# Patient Record
Sex: MALE | Race: ASIAN | NOT HISPANIC OR LATINO | URBAN - METROPOLITAN AREA
[De-identification: names, ages, dates, MRNs, and addresses within clinical notes are randomized per-mention and may not be internally consistent; named-entity substitution may affect disease eponyms.]

---

## 2018-06-11 ENCOUNTER — EMERGENCY (EMERGENCY)
Facility: HOSPITAL | Age: 33
LOS: 1 days | Discharge: HOME | End: 2018-06-11
Attending: EMERGENCY MEDICINE | Admitting: EMERGENCY MEDICINE
Payer: MEDICAID

## 2018-06-11 VITALS
HEART RATE: 88 BPM | DIASTOLIC BLOOD PRESSURE: 76 MMHG | OXYGEN SATURATION: 99 % | RESPIRATION RATE: 16 BRPM | SYSTOLIC BLOOD PRESSURE: 105 MMHG | TEMPERATURE: 99 F

## 2018-06-11 PROCEDURE — 99283 EMERGENCY DEPT VISIT LOW MDM: CPT

## 2018-06-11 RX ORDER — POLYMYXIN B SULF/TRIMETHOPRIM 10000-1/ML
2 DROPS OPHTHALMIC (EYE)
Qty: 1 | Refills: 0
Start: 2018-06-11 | End: 2018-06-17

## 2018-06-11 RX ORDER — POLYMYXIN B SULF/TRIMETHOPRIM 10000-1/ML
2 DROPS OPHTHALMIC (EYE) ONCE
Qty: 0 | Refills: 0 | Status: DISCONTINUED | OUTPATIENT
Start: 2018-06-11 | End: 2018-06-15

## 2018-06-11 NOTE — ED PROVIDER NOTE - OBJECTIVE STATEMENT
32y M with no PMHx presents to the ED for bilateral eye pain, eye irritation, photophobia, and blurry vision x2 months. States s/x is intermittent. Also has eye discharge  x2 weeks. Has crusting in eyes and needs to wash eye out. Currently using antihistamine eyedrops. Reports he woke up with blood in eye and blood crusted eye shut 2 months ago. 32y M with no PMHx presents to the ED for bilateral eye pain, eye irritation, photophobia, and blurry vision x2 months. States s/x is intermittent. Also has eye discharge  x2 weeks. Has crusting in eyes and needs to wash eye out. Currently using antihistamine eyedrops. Reports he woke up with blood in eye and blood crusted eye shut 2 months ago but none since.

## 2018-06-11 NOTE — ED PROVIDER NOTE - EYE EXAM METHOD
Vision 20/20 both eyes. EOMI. PERRL. Bilateral conjunctival irritation with episcleral injection. No discharge or bleeding from eyes at this time Vision 20/20 both eyes. EOMI. PERRL. Bilateral conjunctival irritation with episcleral injection. No discharge or bleeding from eyes at this time.  Lashes normal.

## 2018-06-11 NOTE — ED PROVIDER NOTE - MEDICAL DECISION MAKING DETAILS
Consistent with conjunctivitis. Allergic vs viral vs bacterial. Will treat like bacterial and give outpt opthalmology followup

## 2018-06-11 NOTE — ED ADULT TRIAGE NOTE - CHIEF COMPLAINT QUOTE
states" I have redness with discharge to both eyes with sand maribel feeling for almost 2 months and using Kevin eye drops with no relief."

## 2019-08-11 ENCOUNTER — INPATIENT (INPATIENT)
Facility: HOSPITAL | Age: 34
LOS: 1 days | Discharge: HOME | End: 2019-08-13
Attending: HOSPITALIST | Admitting: HOSPITALIST
Payer: MEDICAID

## 2019-08-11 VITALS
OXYGEN SATURATION: 100 % | RESPIRATION RATE: 18 BRPM | TEMPERATURE: 100 F | SYSTOLIC BLOOD PRESSURE: 122 MMHG | DIASTOLIC BLOOD PRESSURE: 82 MMHG | HEART RATE: 91 BPM

## 2019-08-11 DIAGNOSIS — M35.2 BEHCET'S DISEASE: ICD-10-CM

## 2019-08-11 LAB
ALBUMIN SERPL ELPH-MCNC: 4.8 G/DL — SIGNIFICANT CHANGE UP (ref 3.3–5)
ALP SERPL-CCNC: 81 U/L — SIGNIFICANT CHANGE UP (ref 40–120)
ALT FLD-CCNC: 56 U/L — HIGH (ref 4–41)
ANION GAP SERPL CALC-SCNC: 15 MMO/L — HIGH (ref 7–14)
AST SERPL-CCNC: 54 U/L — HIGH (ref 4–40)
BASOPHILS # BLD AUTO: 0.04 K/UL — SIGNIFICANT CHANGE UP (ref 0–0.2)
BASOPHILS NFR BLD AUTO: 0.6 % — SIGNIFICANT CHANGE UP (ref 0–2)
BILIRUB SERPL-MCNC: 1 MG/DL — SIGNIFICANT CHANGE UP (ref 0.2–1.2)
BUN SERPL-MCNC: 9 MG/DL — SIGNIFICANT CHANGE UP (ref 7–23)
CALCIUM SERPL-MCNC: 10 MG/DL — SIGNIFICANT CHANGE UP (ref 8.4–10.5)
CHLORIDE SERPL-SCNC: 94 MMOL/L — LOW (ref 98–107)
CO2 SERPL-SCNC: 25 MMOL/L — SIGNIFICANT CHANGE UP (ref 22–31)
CREAT SERPL-MCNC: 0.71 MG/DL — SIGNIFICANT CHANGE UP (ref 0.5–1.3)
CRP SERPL-MCNC: < 4 MG/L — SIGNIFICANT CHANGE UP
EOSINOPHIL # BLD AUTO: 0.09 K/UL — SIGNIFICANT CHANGE UP (ref 0–0.5)
EOSINOPHIL NFR BLD AUTO: 1.4 % — SIGNIFICANT CHANGE UP (ref 0–6)
ERYTHROCYTE [SEDIMENTATION RATE] IN BLOOD: 7 MM/HR — SIGNIFICANT CHANGE UP (ref 1–15)
GLUCOSE SERPL-MCNC: 91 MG/DL — SIGNIFICANT CHANGE UP (ref 70–99)
HCT VFR BLD CALC: 44.2 % — SIGNIFICANT CHANGE UP (ref 39–50)
HGB BLD-MCNC: 15.2 G/DL — SIGNIFICANT CHANGE UP (ref 13–17)
HIV COMBO RESULT: SIGNIFICANT CHANGE UP
HIV1+2 AB SPEC QL: SIGNIFICANT CHANGE UP
IMM GRANULOCYTES NFR BLD AUTO: 0.3 % — SIGNIFICANT CHANGE UP (ref 0–1.5)
LYMPHOCYTES # BLD AUTO: 1.54 K/UL — SIGNIFICANT CHANGE UP (ref 1–3.3)
LYMPHOCYTES # BLD AUTO: 24.1 % — SIGNIFICANT CHANGE UP (ref 13–44)
MCHC RBC-ENTMCNC: 31.1 PG — SIGNIFICANT CHANGE UP (ref 27–34)
MCHC RBC-ENTMCNC: 34.4 % — SIGNIFICANT CHANGE UP (ref 32–36)
MCV RBC AUTO: 90.6 FL — SIGNIFICANT CHANGE UP (ref 80–100)
MONOCYTES # BLD AUTO: 0.61 K/UL — SIGNIFICANT CHANGE UP (ref 0–0.9)
MONOCYTES NFR BLD AUTO: 9.5 % — SIGNIFICANT CHANGE UP (ref 2–14)
NEUTROPHILS # BLD AUTO: 4.09 K/UL — SIGNIFICANT CHANGE UP (ref 1.8–7.4)
NEUTROPHILS NFR BLD AUTO: 64.1 % — SIGNIFICANT CHANGE UP (ref 43–77)
NRBC # FLD: 0 K/UL — SIGNIFICANT CHANGE UP (ref 0–0)
PLATELET # BLD AUTO: 193 K/UL — SIGNIFICANT CHANGE UP (ref 150–400)
PMV BLD: 9.8 FL — SIGNIFICANT CHANGE UP (ref 7–13)
POTASSIUM SERPL-MCNC: 4.2 MMOL/L — SIGNIFICANT CHANGE UP (ref 3.5–5.3)
POTASSIUM SERPL-SCNC: 4.2 MMOL/L — SIGNIFICANT CHANGE UP (ref 3.5–5.3)
PROT SERPL-MCNC: 7.7 G/DL — SIGNIFICANT CHANGE UP (ref 6–8.3)
RBC # BLD: 4.88 M/UL — SIGNIFICANT CHANGE UP (ref 4.2–5.8)
RBC # FLD: 12.7 % — SIGNIFICANT CHANGE UP (ref 10.3–14.5)
SODIUM SERPL-SCNC: 134 MMOL/L — LOW (ref 135–145)
WBC # BLD: 6.39 K/UL — SIGNIFICANT CHANGE UP (ref 3.8–10.5)
WBC # FLD AUTO: 6.39 K/UL — SIGNIFICANT CHANGE UP (ref 3.8–10.5)

## 2019-08-11 PROCEDURE — 71046 X-RAY EXAM CHEST 2 VIEWS: CPT | Mod: 26

## 2019-08-11 RX ORDER — ACETAMINOPHEN 500 MG
975 TABLET ORAL ONCE
Refills: 0 | Status: COMPLETED | OUTPATIENT
Start: 2019-08-11 | End: 2019-08-11

## 2019-08-11 RX ORDER — DIPHENHYDRAMINE HCL 50 MG
12.5 CAPSULE ORAL ONCE
Refills: 0 | Status: COMPLETED | OUTPATIENT
Start: 2019-08-11 | End: 2019-08-11

## 2019-08-11 RX ORDER — LIDOCAINE 4 G/100G
10 CREAM TOPICAL ONCE
Refills: 0 | Status: COMPLETED | OUTPATIENT
Start: 2019-08-11 | End: 2019-08-11

## 2019-08-11 RX ORDER — KETOROLAC TROMETHAMINE 30 MG/ML
15 SYRINGE (ML) INJECTION ONCE
Refills: 0 | Status: DISCONTINUED | OUTPATIENT
Start: 2019-08-11 | End: 2019-08-11

## 2019-08-11 RX ORDER — SODIUM CHLORIDE 9 MG/ML
2000 INJECTION INTRAMUSCULAR; INTRAVENOUS; SUBCUTANEOUS ONCE
Refills: 0 | Status: COMPLETED | OUTPATIENT
Start: 2019-08-11 | End: 2019-08-11

## 2019-08-11 RX ADMIN — Medication 15 MILLIGRAM(S): at 21:21

## 2019-08-11 RX ADMIN — LIDOCAINE 10 MILLILITER(S): 4 CREAM TOPICAL at 21:21

## 2019-08-11 RX ADMIN — SODIUM CHLORIDE 1000 MILLILITER(S): 9 INJECTION INTRAMUSCULAR; INTRAVENOUS; SUBCUTANEOUS at 23:13

## 2019-08-11 RX ADMIN — Medication 975 MILLIGRAM(S): at 21:20

## 2019-08-11 RX ADMIN — Medication 30 MILLILITER(S): at 21:21

## 2019-08-11 RX ADMIN — Medication 12.5 MILLIGRAM(S): at 21:30

## 2019-08-11 NOTE — ED PROVIDER NOTE - PHYSICAL EXAMINATION
HENMT: Upper anterior lip: bleeding sore, anterior lower gingiva: chanker sores, anterior lateral tongue on left: chanker sores, right lateral anterior internal mouth: chanker sore    Skin: HENMT: Upper anterior lip: bleeding sore, anterior lower gingiva: chanker sores, anterior lateral tongue on left: chanker sores, right lateral anterior internal mouth: chanker sore    Skin: (B) elbow target-like lesions: purple on inside, erythematous on ring, mildly tender

## 2019-08-11 NOTE — ED ADULT NURSE NOTE - ED STAT RN HANDOFF DETAILS
endorsed to rn sadu. a&o x3, nad noted. safety maintained. offers no medical complaints at this time

## 2019-08-11 NOTE — ED PROVIDER NOTE - CLINICAL SUMMARY MEDICAL DECISION MAKING FREE TEXT BOX
Itzel: ? Behcet's (conjunctivitis, oral ulcers, rash) vs. lupus vs. viral. Check serology. Admit. Pain control.

## 2019-08-11 NOTE — ED PROVIDER NOTE - OBJECTIVE STATEMENT
32 y/o M presents to the ED c/o mouth pain and sores that started 2 months ago. Pt states that he went to a water park where he first felt pain in his cheek bones. He went to a doctor where he was given antiviral medication 2 and a half weeks ago. He rook the medication for 3 days and the pain subsided. Once he stopped taking it, the pain got much worse. Pt also has bumps on his arm and L eye redness and blurriness. Pt's family member states that he has night sweats but denies fever, or  pain. pt currently can not eat food and has subsequently loss weight.   PMH/PSH: negative  FH/SH: non-contributory, except as noted in the HPI  Allergies: No known drug allergies  Immunizations: Up-to-date  Medications: Polytrim

## 2019-08-11 NOTE — ED ADULT TRIAGE NOTE - CHIEF COMPLAINT QUOTE
pt c/o sores on tongue and inside mouth  seen by pmd given antibx and it was going away . so pt stopped taking it/ now it back  also c/o of sores on arms.  c/o itchy feeling to sores and  bruises breakout after scratching

## 2019-08-11 NOTE — ED ADULT NURSE NOTE - OBJECTIVE STATEMENT
33 y male A+OX3 presents to the ER with the complaints of sores to the mouth/lips and both his arms. Pt states initially the sores were on his mouth and lip which he was prescribed valtrex for. Sores went away and pt stopped the med. Sores then reoccurred again in the mouth and lips that became worse and painful. Pt also started experiencing sores that are itchy in both his hands and elbows. Pt denies any recent travel, new meds, or encounter with animals or plants. Pt denies any PMH.  Pt also complains of left eye irritation. Pt denies sores spreading to any other parts of the body. 20 g iv placed on left ac, labs drawn and sent.

## 2019-08-12 ENCOUNTER — RESULT REVIEW (OUTPATIENT)
Age: 34
End: 2019-08-12

## 2019-08-12 DIAGNOSIS — Z87.891 PERSONAL HISTORY OF NICOTINE DEPENDENCE: ICD-10-CM

## 2019-08-12 DIAGNOSIS — F10.10 ALCOHOL ABUSE, UNCOMPLICATED: ICD-10-CM

## 2019-08-12 DIAGNOSIS — H10.9 UNSPECIFIED CONJUNCTIVITIS: ICD-10-CM

## 2019-08-12 DIAGNOSIS — K12.1 OTHER FORMS OF STOMATITIS: ICD-10-CM

## 2019-08-12 DIAGNOSIS — R21 RASH AND OTHER NONSPECIFIC SKIN ERUPTION: ICD-10-CM

## 2019-08-12 DIAGNOSIS — Z29.9 ENCOUNTER FOR PROPHYLACTIC MEASURES, UNSPECIFIED: ICD-10-CM

## 2019-08-12 DIAGNOSIS — R74.0 NONSPECIFIC ELEVATION OF LEVELS OF TRANSAMINASE AND LACTIC ACID DEHYDROGENASE [LDH]: ICD-10-CM

## 2019-08-12 LAB
ALBUMIN SERPL ELPH-MCNC: 4.2 G/DL — SIGNIFICANT CHANGE UP (ref 3.3–5)
ALP SERPL-CCNC: 70 U/L — SIGNIFICANT CHANGE UP (ref 40–120)
ALT FLD-CCNC: 42 U/L — HIGH (ref 4–41)
ANION GAP SERPL CALC-SCNC: 14 MMO/L — SIGNIFICANT CHANGE UP (ref 7–14)
APPEARANCE UR: CLEAR — SIGNIFICANT CHANGE UP
AST SERPL-CCNC: 42 U/L — HIGH (ref 4–40)
BACTERIA # UR AUTO: NEGATIVE — SIGNIFICANT CHANGE UP
BILIRUB SERPL-MCNC: 1.4 MG/DL — HIGH (ref 0.2–1.2)
BILIRUB UR-MCNC: NEGATIVE — SIGNIFICANT CHANGE UP
BLOOD UR QL VISUAL: NEGATIVE — SIGNIFICANT CHANGE UP
BUN SERPL-MCNC: 9 MG/DL — SIGNIFICANT CHANGE UP (ref 7–23)
CALCIUM SERPL-MCNC: 9.3 MG/DL — SIGNIFICANT CHANGE UP (ref 8.4–10.5)
CHLORIDE SERPL-SCNC: 98 MMOL/L — SIGNIFICANT CHANGE UP (ref 98–107)
CMV IGM FLD-ACNC: <8 AU/ML — SIGNIFICANT CHANGE UP
CMV IGM SERPL QL: NEGATIVE — SIGNIFICANT CHANGE UP
CO2 SERPL-SCNC: 25 MMOL/L — SIGNIFICANT CHANGE UP (ref 22–31)
COLOR SPEC: YELLOW — SIGNIFICANT CHANGE UP
CREAT SERPL-MCNC: 0.67 MG/DL — SIGNIFICANT CHANGE UP (ref 0.5–1.3)
EBV EA AB TITR SER IF: POSITIVE — SIGNIFICANT CHANGE UP
EBV EA IGG SER-ACNC: NEGATIVE — SIGNIFICANT CHANGE UP
EBV PATRN SPEC IB-IMP: SIGNIFICANT CHANGE UP
EBV VCA IGG AVIDITY SER QL IA: POSITIVE — SIGNIFICANT CHANGE UP
EBV VCA IGM TITR FLD: NEGATIVE — SIGNIFICANT CHANGE UP
GLUCOSE SERPL-MCNC: 78 MG/DL — SIGNIFICANT CHANGE UP (ref 70–99)
GLUCOSE UR-MCNC: NEGATIVE — SIGNIFICANT CHANGE UP
HAV IGM SER-ACNC: NONREACTIVE — SIGNIFICANT CHANGE UP
HBV CORE IGM SER-ACNC: NONREACTIVE — SIGNIFICANT CHANGE UP
HBV SURFACE AG SER-ACNC: NONREACTIVE — SIGNIFICANT CHANGE UP
HCT VFR BLD CALC: 44.5 % — SIGNIFICANT CHANGE UP (ref 39–50)
HCV AB S/CO SERPL IA: 0.07 S/CO — SIGNIFICANT CHANGE UP (ref 0–0.99)
HCV AB SERPL-IMP: SIGNIFICANT CHANGE UP
HGB BLD-MCNC: 14.7 G/DL — SIGNIFICANT CHANGE UP (ref 13–17)
HYALINE CASTS # UR AUTO: NEGATIVE — SIGNIFICANT CHANGE UP
KETONES UR-MCNC: HIGH
LEUKOCYTE ESTERASE UR-ACNC: NEGATIVE — SIGNIFICANT CHANGE UP
MAGNESIUM SERPL-MCNC: 1.6 MG/DL — SIGNIFICANT CHANGE UP (ref 1.6–2.6)
MCHC RBC-ENTMCNC: 30.6 PG — SIGNIFICANT CHANGE UP (ref 27–34)
MCHC RBC-ENTMCNC: 33 % — SIGNIFICANT CHANGE UP (ref 32–36)
MCV RBC AUTO: 92.5 FL — SIGNIFICANT CHANGE UP (ref 80–100)
NITRITE UR-MCNC: NEGATIVE — SIGNIFICANT CHANGE UP
NRBC # FLD: 0 K/UL — SIGNIFICANT CHANGE UP (ref 0–0)
PH UR: 6.5 — SIGNIFICANT CHANGE UP (ref 5–8)
PHOSPHATE SERPL-MCNC: 3.4 MG/DL — SIGNIFICANT CHANGE UP (ref 2.5–4.5)
PLATELET # BLD AUTO: 169 K/UL — SIGNIFICANT CHANGE UP (ref 150–400)
PMV BLD: 10.1 FL — SIGNIFICANT CHANGE UP (ref 7–13)
POTASSIUM SERPL-MCNC: 4.7 MMOL/L — SIGNIFICANT CHANGE UP (ref 3.5–5.3)
POTASSIUM SERPL-SCNC: 4.7 MMOL/L — SIGNIFICANT CHANGE UP (ref 3.5–5.3)
PROT SERPL-MCNC: 6.9 G/DL — SIGNIFICANT CHANGE UP (ref 6–8.3)
PROT UR-MCNC: 20 — SIGNIFICANT CHANGE UP
RBC # BLD: 4.81 M/UL — SIGNIFICANT CHANGE UP (ref 4.2–5.8)
RBC # FLD: 12.6 % — SIGNIFICANT CHANGE UP (ref 10.3–14.5)
RBC CASTS # UR COMP ASSIST: SIGNIFICANT CHANGE UP (ref 0–?)
SODIUM SERPL-SCNC: 137 MMOL/L — SIGNIFICANT CHANGE UP (ref 135–145)
SP GR SPEC: 1.04 — SIGNIFICANT CHANGE UP (ref 1–1.04)
SQUAMOUS # UR AUTO: SIGNIFICANT CHANGE UP
UROBILINOGEN FLD QL: SIGNIFICANT CHANGE UP
WBC # BLD: 5.51 K/UL — SIGNIFICANT CHANGE UP (ref 3.8–10.5)
WBC # FLD AUTO: 5.51 K/UL — SIGNIFICANT CHANGE UP (ref 3.8–10.5)
WBC UR QL: SIGNIFICANT CHANGE UP (ref 0–?)

## 2019-08-12 PROCEDURE — 88346 IMFLUOR 1ST 1ANTB STAIN PX: CPT | Mod: 26

## 2019-08-12 PROCEDURE — 88305 TISSUE EXAM BY PATHOLOGIST: CPT | Mod: 26

## 2019-08-12 PROCEDURE — 88350 IMFLUOR EA ADDL 1ANTB STN PX: CPT | Mod: 26

## 2019-08-12 PROCEDURE — 12345: CPT | Mod: NC,GC

## 2019-08-12 PROCEDURE — 99223 1ST HOSP IP/OBS HIGH 75: CPT

## 2019-08-12 PROCEDURE — 88312 SPECIAL STAINS GROUP 1: CPT | Mod: 26

## 2019-08-12 RX ORDER — NICOTINE POLACRILEX 2 MG
1 GUM BUCCAL DAILY
Refills: 0 | Status: DISCONTINUED | OUTPATIENT
Start: 2019-08-12 | End: 2019-08-13

## 2019-08-12 RX ORDER — FLUOCINONIDE/EMOLLIENT BASE 0.05 %
1 CREAM (GRAM) TOPICAL THREE TIMES A DAY
Refills: 0 | Status: DISCONTINUED | OUTPATIENT
Start: 2019-08-12 | End: 2019-08-12

## 2019-08-12 RX ORDER — ACETAMINOPHEN 500 MG
1000 TABLET ORAL EVERY 8 HOURS
Refills: 0 | Status: COMPLETED | OUTPATIENT
Start: 2019-08-12 | End: 2019-08-12

## 2019-08-12 RX ORDER — ENOXAPARIN SODIUM 100 MG/ML
40 INJECTION SUBCUTANEOUS DAILY
Refills: 0 | Status: DISCONTINUED | OUTPATIENT
Start: 2019-08-12 | End: 2019-08-13

## 2019-08-12 RX ORDER — VALACYCLOVIR 500 MG/1
1000 TABLET, FILM COATED ORAL
Refills: 0 | Status: DISCONTINUED | OUTPATIENT
Start: 2019-08-12 | End: 2019-08-13

## 2019-08-12 RX ORDER — ACETAMINOPHEN 500 MG
650 TABLET ORAL EVERY 6 HOURS
Refills: 0 | Status: DISCONTINUED | OUTPATIENT
Start: 2019-08-12 | End: 2019-08-13

## 2019-08-12 RX ORDER — DIPHENHYDRAMINE HYDROCHLORIDE AND LIDOCAINE HYDROCHLORIDE AND ALUMINUM HYDROXIDE AND MAGNESIUM HYDRO
5 KIT
Refills: 0 | Status: DISCONTINUED | OUTPATIENT
Start: 2019-08-12 | End: 2019-08-13

## 2019-08-12 RX ADMIN — Medication 1 APPLICATION(S): at 06:46

## 2019-08-12 RX ADMIN — Medication 400 MILLIGRAM(S): at 08:59

## 2019-08-12 RX ADMIN — Medication 1 APPLICATION(S): at 14:28

## 2019-08-12 RX ADMIN — Medication 1 PATCH: at 12:18

## 2019-08-12 RX ADMIN — Medication 650 MILLIGRAM(S): at 14:28

## 2019-08-12 RX ADMIN — DIPHENHYDRAMINE HYDROCHLORIDE AND LIDOCAINE HYDROCHLORIDE AND ALUMINUM HYDROXIDE AND MAGNESIUM HYDRO 5 MILLILITER(S): KIT at 06:47

## 2019-08-12 RX ADMIN — VALACYCLOVIR 1000 MILLIGRAM(S): 500 TABLET, FILM COATED ORAL at 17:54

## 2019-08-12 RX ADMIN — DIPHENHYDRAMINE HYDROCHLORIDE AND LIDOCAINE HYDROCHLORIDE AND ALUMINUM HYDROXIDE AND MAGNESIUM HYDRO 5 MILLILITER(S): KIT at 17:54

## 2019-08-12 RX ADMIN — Medication 40 MILLIGRAM(S): at 17:54

## 2019-08-12 RX ADMIN — Medication 650 MILLIGRAM(S): at 15:23

## 2019-08-12 RX ADMIN — Medication 1 PATCH: at 17:16

## 2019-08-12 RX ADMIN — ENOXAPARIN SODIUM 40 MILLIGRAM(S): 100 INJECTION SUBCUTANEOUS at 12:19

## 2019-08-12 RX ADMIN — DIPHENHYDRAMINE HYDROCHLORIDE AND LIDOCAINE HYDROCHLORIDE AND ALUMINUM HYDROXIDE AND MAGNESIUM HYDRO 5 MILLILITER(S): KIT at 13:01

## 2019-08-12 NOTE — H&P ADULT - PROBLEM SELECTOR PLAN 4
-related to underlying systemic/rheum/infectious issue vs 2/2 to recent valacyclovir use  -follow up acute viral panel  -monitor CMP, if worsening consider abdominal US for further evaluation

## 2019-08-12 NOTE — DIETITIAN INITIAL EVALUATION ADULT. - PERTINENT MEDS FT
Problem: Patient Care Overview  Goal: Plan of Care Review  Outcome: Outcome(s) achieved Date Met: 11/23/17  Infant voiding and stooling. Infant tolerating feedings. V/S WNL. No Distress noted.       Ativan

## 2019-08-12 NOTE — H&P ADULT - PROBLEM SELECTOR PLAN 2
-violaceous target-like lesions with scaling on extensor surfaces of upper extremities. DDx includes erythema multiforme vs psoriasis vs pemphigus vs atopic dermatitis   -Consider derm eval for skin biopsy to further aid in diagnosis

## 2019-08-12 NOTE — H&P ADULT - ASSESSMENT
32 y/o M with no significant PMH presents to the ED with oral ulcers, rash and red eye admitted for further w/u

## 2019-08-12 NOTE — H&P ADULT - PROBLEM SELECTOR PLAN 5
-Pt reports smoking 1 ppd. Will start on nicotine patch. Can continue discussion regarding motivation to quit  -Also endorses drinking nightly. Denies history of withdrawal symptoms. Currently no symptoms of withdrawal. Will monitor off CIWA -endorses drinking nightly. Denies history of withdrawal symptoms. Currently no symptoms of withdrawal however given volume of reported drinking, will place on symptom triggered CIWA  -SBIRT in am

## 2019-08-12 NOTE — H&P ADULT - PROBLEM SELECTOR PLAN 1
-Pt with oral mucosal ulcerations and erosions with associated rash and conjunctivitis   -Broad ddx including rheumatological vs infectious vs inflammatory etiology. Clinical picture concerning for Bechets disease vs limited erythema multiforme  Unlikely Raffy Med given small surface area involvement.  -HIV negative, ESR/CRP negative. F/u cmv, ebv, MALA  -Will treat with oral topical steroid and magic mouth wash solution 4x a day. If no improvement, can trial a course of steriods  -liquid diet for now  -Pain control -Pt with oral mucosal ulcerations and erosions with associated rash and conjunctivitis   -Broad ddx including rheumatological vs infectious vs inflammatory etiology. Clinical picture concerning for Bechets disease vs limited erythema multiforme  Unlikely Raffy Med given small surface area involvement.  -HIV negative, ESR/CRP negative. F/u cmv, ebv, MALA  -Will treat with oral topical steroid and magic mouth wash solution 4x a day. If no improvement, can trial a course of PO steroids  -liquid diet for now  -Pain control

## 2019-08-12 NOTE — H&P ADULT - NSHPREVIEWOFSYSTEMS_GEN_ALL_CORE
CONSTITUTIONAL: +weight loss +night sweats +fatigue No fever  EENT:  Eyes:  +left blurry vision No visual loss, double vision or yellow sclerae. Ears, Nose, Throat:  No hearing loss, sneezing, congestion, runny nose or sore throat.  SKIN:  +pruritic rash  CARDIOVASCULAR:  No chest pain, chest pressure or chest discomfort. No palpitations or edema.  RESPIRATORY:  No shortness of breath, cough or sputum.  GASTROINTESTINAL: +anorexia  No nausea, vomiting or diarrhea. No abdominal pain or blood.  GENITOURINARY:  Denies hematuria, dysuria.   NEUROLOGICAL:  No headache, dizziness, syncope, paralysis, ataxia, numbness or tingling in the extremities. No change in bowel or bladder control.  MUSCULOSKELETAL:  No muscle, back pain, joint pain or stiffness.  HEMATOLOGIC:  No anemia, bleeding or bruising.  LYMPHATICS:  No enlarged nodes. No history of splenectomy.  PSYCHIATRIC:  No history of depression or anxiety.  ENDOCRINOLOGIC:  No reports of sweating, cold or heat intolerance. No polyuria or polydipsia.  ALLERGIES:  No history of asthma, hives, eczema or rhinitis. CONSTITUTIONAL: +weight loss +night sweats +fatigue No fever  EENT:  Eyes:  +left blurry vision +oral ulcers No visual loss, double vision or yellow sclerae. Ears, Nose, Throat:  No hearing loss, sneezing, congestion, runny nose or sore throat.  SKIN:  +pruritic rash  CARDIOVASCULAR:  No chest pain, chest pressure or chest discomfort. No palpitations or edema.  RESPIRATORY:  No shortness of breath, cough or sputum.  GASTROINTESTINAL: +anorexia  No nausea, vomiting or diarrhea. No abdominal pain or blood.  GENITOURINARY:  Denies hematuria, dysuria.   NEUROLOGICAL:  No headache, dizziness, syncope, paralysis, ataxia, numbness or tingling in the extremities. No change in bowel or bladder control.  MUSCULOSKELETAL:  No muscle, back pain, joint pain or stiffness.  HEMATOLOGIC:  No anemia, bleeding or bruising.  LYMPHATICS:  No enlarged nodes. No history of splenectomy.  PSYCHIATRIC:  No history of depression or anxiety.  ENDOCRINOLOGIC:  No reports of sweating, cold or heat intolerance. No polyuria or polydipsia.  ALLERGIES:  No history of asthma, hives, eczema or rhinitis.

## 2019-08-12 NOTE — CONSULT NOTE ADULT - ASSESSMENT
34 yo M w/ no significant hx p/w painful oral ulcers and rash.  Patient had improvement on anti-viral medications, but then symptoms returned and worsened when meds were stopped early.    DDx include autoimmune vs infectious. Autoimmune etiologies include behcets, SLE (however lupus usually has painless ulcers), bullous pemphigoid, pemphigus vulgaris, erythema multiforme. Infectious etiologies include viral causes HSV, VZV, HIV, coxsackie.    - derm consulted, f/u recs  - skin bx done, f/u skin bx path  - oral swab sent, f/u results    Antony Dallas MD  Rheumatology Fellow PGY V

## 2019-08-12 NOTE — CONSULT NOTE ADULT - SUBJECTIVE AND OBJECTIVE BOX
HPI:    32 y/o M with no significant PMH presents to the ED with oral ulcers, rash forearms. Pt reports onset of ulcers on lip about 2 weeks ago. He was prescribed valacyclovir and took it for about 2-3 days but discontinued due to improvement. Pt reports shortly after stopping anti-viral therapy, he had recurrence of ulcers on lips inner cheek and on tongue. Patient developed rash on forearms and elbows 4 days ago and patient decided to come in for evaluation.     Dermatology consulted for etiology of the oral findings and rash on forearms/elbows.       PAST MEDICAL & SURGICAL HISTORY:  No pertinent past medical history  No significant past surgical history      REVIEW OF SYSTEMS    General: no fevers/chills, no lethargy	    Skin/Breast: see HPI  	  Ophthalmologic: some eye dryness   	  ENMT: pain eating due to oral lesions    Respiratory and Thorax: no SOB or cough  	  Cardiovascular: no palpitations or chest pain    Gastrointestinal: no abdominal pain or blood in stool     Genitourinary: no dysuria or frequency    Musculoskeletal: no joint pains    Neurological: no weakness, numbness , or tingling    MEDICATIONS  (STANDING):  enoxaparin Injectable 40 milliGRAM(s) SubCutaneous daily  FIRST- Mouthwash  BLM 5 milliLiter(s) Swish and Spit four times a day  nicotine - 21 mG/24Hr(s) Patch 1 patch Transdermal daily  predniSONE   Tablet   Oral   predniSONE   Tablet 40 milliGRAM(s) Oral daily  triamcinolone 0.1% Oral Paste 1 Application(s) Topical three times a day  valACYclovir 1000 milliGRAM(s) Oral two times a day    MEDICATIONS  (PRN):  acetaminophen    Suspension .. 650 milliGRAM(s) Oral every 6 hours PRN Mild Pain (1 - 3), Moderate Pain (4 - 6)  LORazepam     Tablet 2 milliGRAM(s) Oral every 2 hours PRN Symptom-triggered 2 point increase in CIWA-Ar      Allergies    No Known Allergies    Intolerances        SOCIAL HISTORY:    FAMILY HISTORY:  Family history of cirrhosis of liver: mom- 2/2 to alcohol abuse      Vital Signs Last 24 Hrs  T(C): 36.8 (12 Aug 2019 13:59), Max: 37.6 (11 Aug 2019 20:17)  T(F): 98.3 (12 Aug 2019 13:59), Max: 99.6 (11 Aug 2019 20:17)  HR: 65 (12 Aug 2019 13:59) (52 - 91)  BP: 100/68 (12 Aug 2019 13:59) (98/57 - 122/82)  BP(mean): --  RR: 17 (12 Aug 2019 13:59) (16 - 18)  SpO2: 99% (12 Aug 2019 13:59) (99% - 100%)    PHYSICAL EXAM:     The patient was alert and oriented X 3, well nourished, and in no  apparent distress.  OP showed no ulcerations  There was no visible lymphadenopathy.  Conjunctiva were non injected  There was no clubbing or edema of extremities.  The scalp, hair, face, eyebrows, lips, OP, neck, chest, back,   extremities X 4, nails were examined.  There was no hyperhidrosis or bromhidrosis.    Of note on skin exam:     oral ulcers on the vermilion lip, buccal mucosa, lower and upper lip, some hemorrhagic crust present upper lip and vermilion lip     bilateral forearms/ elbows: violaceous edematous plaques with hyperpigmented center region        LABS:                        14.7   5.51  )-----------( 169      ( 12 Aug 2019 05:27 )             44.5     08-12    137  |  98  |  9   ----------------------------<  78  4.7   |  25  |  0.67    Ca    9.3      12 Aug 2019 05:27  Phos  3.4     08-12  Mg     1.6     -12    TPro  6.9  /  Alb  4.2  /  TBili  1.4<H>  /  DBili  x   /  AST  42<H>  /  ALT  42<H>  /  AlkPhos  70  08-12      Urinalysis Basic - ( 12 Aug 2019 06:55 )    Color: YELLOW / Appearance: CLEAR / S.036 / pH: 6.5  Gluc: NEGATIVE / Ketone: MODERATE  / Bili: NEGATIVE / Urobili: TRACE   Blood: NEGATIVE / Protein: 20 / Nitrite: NEGATIVE   Leuk Esterase: NEGATIVE / RBC: 0-2 / WBC 3-5   Sq Epi: OCC / Non Sq Epi: x / Bacteria: NEGATIVE        RADIOLOGY & ADDITIONAL STUDIES:

## 2019-08-12 NOTE — H&P ADULT - PROBLEM SELECTOR PLAN 3
-redness and pruritis of eye, mild pain with associated blurriness of vision. Concerning for uveitis (in the setting of associated rash and oral ulcers) vs infectious etiology (viral, bacterial)  -Opthalmology eval in AM

## 2019-08-12 NOTE — DIETITIAN INITIAL EVALUATION ADULT. - OTHER INFO
Pt has blisters on his lips and tongue, a rash and red eye. Pt has not been able to eat very much. Pt is on full liquid diet. Suggest Pt be started on Ensure Enlive and Ensure pudding. Pt willing to try supplements.

## 2019-08-12 NOTE — DIETITIAN INITIAL EVALUATION ADULT. - PROBLEM SELECTOR PLAN 1
-Pt with oral mucosal ulcerations and erosions with associated rash and conjunctivitis   -Broad ddx including rheumatological vs infectious vs inflammatory etiology. Clinical picture concerning for Bechets disease vs limited erythema multiforme  Unlikely Raffy Med given small surface area involvement.  -HIV negative, ESR/CRP negative. F/u cmv, ebv, MALA  -Will treat with oral topical steroid and magic mouth wash solution 4x a day. If no improvement, can trial a course of PO steroids  -liquid diet for now  -Pain control

## 2019-08-12 NOTE — PATIENT PROFILE ADULT - TOBACCO CESSATION EDUCATION/COUNSELLING(PROVIDED IF TOBACCO USED IN THE PAST 30 DAYS) NON CORE MEASURE SITES
Pharmacist called; had questions regarding prescription. Seen in  03/04 by Perla  
Spoke with [facility]. They have no knowledge on who called. No outpatient medications were prescribed for visit per chart review. Closing encounter.  Madai PARTIDA CMA...3/6/2019 11:59 AM    
Offered and patient declined

## 2019-08-12 NOTE — H&P ADULT - HISTORY OF PRESENT ILLNESS
32 y/o M with no significant PMH presents to the ED with oral ulcers, rash and red eye. Pt reports onset of ulcers on lip about 2 weeks ago. He was prescribed valacyclovir and took it for about 2-3 days but discontinued due to improvement. Pt reports shortly after stopping anti-viral therapy, he had recurrence of ulcers on lips inner cheek and on tongue. He also noted a new rash on his upper extremities and a red eye that is pruritic and intermittently associated with blurry vision. Pt reports he had similar red eye with blurry vision a year ago and was evaluated by opthalmology He is unclear of the diagnosis and states he was prescribed antibiotics gtts as well as another unidentifiable eye drop. Pt reports the oral ulcers have progressed to the point that he is unable to eat. Denies dysphagia symptoms, N/V, abdominal pain but reports a 10 lb weight loss over 2 weeks secondary to decreased PO intake. Reports night sweats, denies fevers, chills, chest pain, shortness of breath, dysuria, genital ulcers, arthralgias, numbness or weakness. Denies recent travel or sick contacts. 34 y/o M with no significant PMH presents to the ED with oral ulcers, rash and red eye. Pt reports onset of ulcers on lip about 2 weeks ago. He was prescribed valacyclovir and took it for about 2-3 days but discontinued due to improvement. Pt reports shortly after stopping anti-viral therapy, he had recurrence of ulcers on lips inner cheek and on tongue. He also noted a new rash on his upper extremities and a red eye that is pruritic and intermittently associated with blurry vision. Pt reports he had similar red eye with blurry vision a year ago and was evaluated by opthalmology He is unclear of the diagnosis and states he was prescribed antibiotics gtts as well as another unidentifiable eye drop. Pt reports the oral ulcers have progressed to the point that he is unable to eat. Denies dysphagia symptoms, N/V, abdominal pain but reports a 10 lb weight loss over 2 weeks secondary to decreased PO intake. Reports night sweats, denies fevers, chills, chest pain, shortness of breath, dysuria, genital ulcers, penile discharge, arthralgias, numbness or weakness. Denies recent travel or sick contacts. Sexually active with wife, denies h/o of STDs.

## 2019-08-12 NOTE — H&P ADULT - NSHPLABSRESULTS_GEN_ALL_CORE
(08-11 @ 21:00)                      15.2  6.39 )-----------( 193                 44.2    Neutrophils = 4.09 (64.1%)  Lymphocytes = 1.54 (24.1%)  Eosinophils = 0.09 (1.4%)  Basophils = 0.04 (0.6%)  Monocytes = 0.61 (9.5%)  Bands = --%    08-11    134<L>  |  94<L>  |  9   ----------------------------<  91  4.2   |  25  |  0.71    Ca    10.0      11 Aug 2019 21:04    TPro  7.7  /  Alb  4.8  /  TBili  1.0  /  DBili  x   /  AST  54<H>  /  ALT  56<H>  /  AlkPhos  81  08-11      Labs reviewed.  Transaminitis otherwise unremarkable  Imaging reviewed.    < from: Xray Chest 2 Views PA/Lat (08.11.19 @ 21:25) >      INTERPRETATION:  Clear lungs    < end of copied text >

## 2019-08-12 NOTE — H&P ADULT - NSHPSOCIALHISTORY_GEN_ALL_CORE
Current 1 ppd smoker. Drinks 1/2 pint of Basalt and has 2-3 beers every night. Occasional marijuana user   Works in a store

## 2019-08-12 NOTE — CONSULT NOTE ADULT - ASSESSMENT
1. 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   The risks/benefits/alternatives of skin biopsy were explained to the patient, which include and are not limited to bleeding, infection, scarring or discoloration of skin, and recurrence of lesion. Patient expressed understanding of these risks and provided consent to the procedure. Time out with verification of patient and lesion site was performed. Site was prepped with rubbing alcohol, lidocaine with epinephrine was injected for anesthesia, and biopsy was performed. Specimen sent to path. Procedure was without complication and well tolerated. Wound care was discussed.  -Patient should keep area dry for 24 hours, and then can wash area. Apply Vaseline daily for 2 weeks. Chromic suture will dissolve in about 2 weeks. 1. 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   The risks/benefits/alternatives of skin biopsy were explained to the patient, which include and are not limited to bleeding, infection, scarring or discoloration of skin, and recurrence of lesion. Patient expressed understanding of these risks and provided consent to the procedure. Time out with verification of patient and lesion site was performed. Site was prepped with rubbing alcohol, lidocaine with epinephrine was injected for anesthesia, and biopsy was performed. Specimen sent to path. Procedure was without complication and well tolerated. Wound care was discussed.  -Patient should keep area dry for 24 hours, and then can wash area. Apply Vaseline daily for 2 weeks. Chromic suture will dissolve in about 2 weeks.

## 2019-08-12 NOTE — DIETITIAN INITIAL EVALUATION ADULT. - PROBLEM SELECTOR PLAN 6
-Pt reports smoking 1 ppd. Will start on nicotine patch. Can continue discussion regarding motivation to quit

## 2019-08-12 NOTE — CONSULT NOTE ADULT - SUBJECTIVE AND OBJECTIVE BOX
JOANNE LAU  [id number]    HISTORY OF PRESENT ILLNESS:        PAST MEDICAL & SURGICAL HISTORY:  No pertinent past medical history  No significant past surgical history      Review of Systems:  Gen:  No fevers/chills, weight loss  HEENT: No blurry vision, no difficulty swallowing, no oral or nasal ulcers  CVS: No chest pain/palpitations  Resp: No SOB/wheezing  GI: No N/V/C/D/abdominal pain  MSK:  Skin: No new rashes  Neuro: No headaches    MEDICATIONS  (STANDING):  enoxaparin Injectable 40 milliGRAM(s) SubCutaneous daily  FIRST- Mouthwash  BLM 5 milliLiter(s) Swish and Spit four times a day  nicotine - 21 mG/24Hr(s) Patch 1 patch Transdermal daily  triamcinolone 0.1% Oral Paste 1 Application(s) Topical three times a day    MEDICATIONS  (PRN):  acetaminophen    Suspension .. 650 milliGRAM(s) Oral every 6 hours PRN Mild Pain (1 - 3), Moderate Pain (4 - 6)  LORazepam     Tablet 2 milliGRAM(s) Oral every 2 hours PRN Symptom-triggered 2 point increase in CIWA-Ar      Allergies    No Known Allergies    Intolerances        PERTINENT MEDICATION HISTORY:    SOCIAL HISTORY:  OCCUPATION:  TRAVEL HISTORY:    FAMILY HISTORY:  Family history of cirrhosis of liver: mom- 2/2 to alcohol abuse      Vital Signs Last 24 Hrs  T(C): 36.7 (12 Aug 2019 09:51), Max: 37.6 (11 Aug 2019 20:17)  T(F): 98.1 (12 Aug 2019 09:51), Max: 99.6 (11 Aug 2019 20:17)  HR: 52 (12 Aug 2019 09:51) (52 - 91)  BP: 98/57 (12 Aug 2019 09:51) (98/57 - 122/82)  BP(mean): --  RR: 17 (12 Aug 2019 09:51) (16 - 18)  SpO2: 99% (12 Aug 2019 09:51) (99% - 100%)    Physical Exam:  General: No apparent distress  HEENT: EOMI, MMM  CVS: +S1/S2, RRR, no murmurs/rubs/gallops  Resp: CTA b/l. No crackles/wheezing  GI: Soft, NT/ND +BS  MSK:  Neuro: AAOx3  Skin: no visible rashes    LABS:                        14.7   5.51  )-----------( 169      ( 12 Aug 2019 05:27 )             44.5     08-12    137  |  98  |  9   ----------------------------<  78  4.7   |  25  |  0.67    Ca    9.3      12 Aug 2019 05:27  Phos  3.4     -  Mg     1.6         TPro  6.9  /  Alb  4.2  /  TBili  1.4<H>  /  DBili  x   /  AST  42<H>  /  ALT  42<H>  /  AlkPhos  70  0812      Urinalysis Basic - ( 12 Aug 2019 06:55 )    Color: YELLOW / Appearance: CLEAR / S.036 / pH: 6.5  Gluc: NEGATIVE / Ketone: MODERATE  / Bili: NEGATIVE / Urobili: TRACE   Blood: NEGATIVE / Protein: 20 / Nitrite: NEGATIVE   Leuk Esterase: NEGATIVE / RBC: 0-2 / WBC 3-5   Sq Epi: OCC / Non Sq Epi: x / Bacteria: NEGATIVE        RADIOLOGY & ADDITIONAL STUDIES: JOANNE LAU  [id number]    HISTORY OF PRESENT ILLNESS:    32 yo M w/ no significant hx p/w oral ulcers and rash.    Patient stated that about 1 month ago started to have some pain on the inside of his cheeks. The pain spread and continued to worsen. Then started to note oral ulcers on the inside of his mouth. He went to his PMD, was given anti-viral meds for presumed HSV. He took the meds for 3 days, his oral ulcers stopped enlarging but did not go away. After 3 days, patient stopped taking his meds because he thought his lesions would continue to resolve. Patient's oral ulcers started to worsen again. Then, several days prior to admission patient developed new rashes on his b/l elbow areas. Since the rash appeared it has not spread, worsened or improved. Patient denied ever having oral ulcers or a rash like this before. Denied any genital ulcers, new sexual partners. Patient denied any joint pains, alopecia, chest pain, SOB, abd pain, hemoptysis, hematuria, BRBPR, frothy urine, hx of blood clots. Denied famhx of oral ulcers.     PAST MEDICAL & SURGICAL HISTORY:  No pertinent past medical history  No significant past surgical history      Review of Systems:  Gen:  No fevers/chills, weight loss  HEENT: +oral ulcers  CVS: No chest pain/palpitations  Resp: No SOB/wheezing  GI: No N/V/C/D/abdominal pain  MSK: no joint pains  Skin: +rash  Neuro: No headaches    MEDICATIONS  (STANDING):  enoxaparin Injectable 40 milliGRAM(s) SubCutaneous daily  FIRST- Mouthwash  BLM 5 milliLiter(s) Swish and Spit four times a day  nicotine - 21 mG/24Hr(s) Patch 1 patch Transdermal daily  triamcinolone 0.1% Oral Paste 1 Application(s) Topical three times a day    MEDICATIONS  (PRN):  acetaminophen    Suspension .. 650 milliGRAM(s) Oral every 6 hours PRN Mild Pain (1 - 3), Moderate Pain (4 - 6)  LORazepam     Tablet 2 milliGRAM(s) Oral every 2 hours PRN Symptom-triggered 2 point increase in CIWA-Ar      Allergies    No Known Allergies    Intolerances    SOCIAL HISTORY: current smoker, 1 PPD, current alcohol abuse, 2-3 drinks/day  TRAVEL HISTORY: no recent travel    FAMILY HISTORY:  Family history of cirrhosis of liver: mom- 2/2 to alcohol abuse      Vital Signs Last 24 Hrs  T(C): 36.7 (12 Aug 2019 09:51), Max: 37.6 (11 Aug 2019 20:17)  T(F): 98.1 (12 Aug 2019 09:51), Max: 99.6 (11 Aug 2019 20:17)  HR: 52 (12 Aug 2019 09:51) (52 - 91)  BP: 98/57 (12 Aug 2019 09:51) (98/57 - 122/82)  BP(mean): --  RR: 17 (12 Aug 2019 09:51) (16 - 18)  SpO2: 99% (12 Aug 2019 09:51) (99% - 100%)    Physical Exam:  General: No apparent distress  HEENT: oral ulcers on the lip, oral mucosa  CVS: +S1/S2, RRR, no murmurs/rubs/gallops  Resp: CTA b/l. No crackles/wheezing  GI: Soft, NT/ND +BS  MSK: no synovitis  Neuro: AAOx3  Skin: purpuric raised plaques    LABS:                        14.7   5.51  )-----------( 169      ( 12 Aug 2019 05:27 )             44.5     08-12    137  |  98  |  9   ----------------------------<  78  4.7   |  25  |  0.67    Ca    9.3      12 Aug 2019 05:27  Phos  3.4     -12  Mg     1.6     12    TPro  6.9  /  Alb  4.2  /  TBili  1.4<H>  /  DBili  x   /  AST  42<H>  /  ALT  42<H>  /  AlkPhos  70  08-12      Urinalysis Basic - ( 12 Aug 2019 06:55 )    Color: YELLOW / Appearance: CLEAR / S.036 / pH: 6.5  Gluc: NEGATIVE / Ketone: MODERATE  / Bili: NEGATIVE / Urobili: TRACE   Blood: NEGATIVE / Protein: 20 / Nitrite: NEGATIVE   Leuk Esterase: NEGATIVE / RBC: 0-2 / WBC 3-5   Sq Epi: OCC / Non Sq Epi: x / Bacteria: NEGATIVE        RADIOLOGY & ADDITIONAL STUDIES:    Sedimentation Rate, Erythrocyte: 7 mm/hr (19 @ 21:00)    C-Reactive Protein, Serum: < 4.0 mg/L (19 @ 21:04)    HIV Combo Result: NonReact: If patient is suspected to be at risk and/or in the  diagnostic window period, then consider subsequent HIV  retesting with new sample. (19 @ 21:04)    Acute Hepatitis Panel (19 @ 05:27)    Hepatitis C Virus Interpretation: Nonreactive Hepatitis C AB  S/CO Ratio                        Interpretation  < 1.00                                   Non-Reactive  1.00 - 4.99                         Weakly-Reactive  >= 5.00                                Reactive  Non-Reactive: [place] with a non-reactive HCV antibody  result is considered uninfected.  No further action is  needed unless recent infection is suspected.  In these  cases, consider repeat testing later to detect  seroconversion..  Weakly-Reactive: HCV antibody test is abnormal, HCV RNA  Qualitative test will follow.  Reactive: HCV antibody test is abnormal, HCV RNA  Qualitative test will follow.  Note: HCV antibody testing is performed on the [employer]   system.    Hepatitis C Virus S/CO Ratio: 0.07 S/CO    Hepatitis B Core IgM Antibody: Nonreactive    Hepatitis B Surface Antigen: Nonreactive    Hepatitis A IgM Antibody: Nonreactive

## 2019-08-12 NOTE — CONSULT NOTE ADULT - ATTENDING COMMENTS
patient seen and examined by me personally   agree with assessment and plan as above   he will follow up w/ [facility] as outpatient   will follow up results of biopsy

## 2019-08-12 NOTE — SBIRT NOTE ADULT - NSSBIRTBRIEFINTDET_GEN_A_CORE
Patient reported he was in inpatient rehab facility previously (about 3 years ago), he reported to SW that he no longer drinks hard alcohol 'I just drink beer".  SW offered inpatient and outpatient services and patient reported he is not interested at this time,  Education to patient is provided.

## 2019-08-12 NOTE — H&P ADULT - NSHPPHYSICALEXAM_GEN_ALL_CORE
GENERAL APPEARANCE: Well developed, alert and cooperative, uncomfortable appearing 2/2 to pain  HEENT:  PERRL, EOMI. External auditory canals normal, hearing grossly intact. Ulceration on top lip with dried blood. Ulcers on side of tongue, in buccal surfaces bilaterally   NECK: Neck supple, non-tender without lymphadenopathy, masses or thyromegaly.  CARDIAC: Normal S1 and S2. No S3, S4 or murmurs. Rhythm is regular.  LUNGS: Clear to auscultation and percussion without rales, rhonchi, wheezing or diminished breath sounds.  ABDOMEN: Positive bowel sounds. Soft, nondistended, nontender. No guarding or rebound.   MUSCULOSKELETAL: ROM extremities. No joint erythema or tenderness.   BACK: no spinal deformity, symmetry of spinal muscles, without tenderness, decreased range of motion.  EXTREMITIES: No significant deformity or joint abnormality. No edema. Peripheral pulses intact. No varicosities.  NEUROLOGICAL: CN II-XII intact. Strength and sensation symmetric and intact throughout.   SKIN: violaceous target-like lesions with scaling on extensor surfaces of upper extremities, one popped bullae on left elbow   PSYCHIATRIC: AOx3.Normal affect and behavior.

## 2019-08-12 NOTE — H&P ADULT - PROBLEM SELECTOR PLAN 6
-lovenox for dvt ppx    Liquid diet, advance as tolerated -Pt reports smoking 1 ppd. Will start on nicotine patch. Can continue discussion regarding motivation to quit

## 2019-08-13 ENCOUNTER — TRANSCRIPTION ENCOUNTER (OUTPATIENT)
Age: 34
End: 2019-08-13

## 2019-08-13 VITALS
DIASTOLIC BLOOD PRESSURE: 73 MMHG | OXYGEN SATURATION: 99 % | HEART RATE: 66 BPM | SYSTOLIC BLOOD PRESSURE: 106 MMHG | RESPIRATION RATE: 17 BRPM | TEMPERATURE: 98 F

## 2019-08-13 LAB
HSV+VZV DNA SPEC QL NAA+PROBE: SIGNIFICANT CHANGE UP
SPECIMEN SOURCE: SIGNIFICANT CHANGE UP

## 2019-08-13 PROCEDURE — 99239 HOSP IP/OBS DSCHRG MGMT >30: CPT

## 2019-08-13 PROCEDURE — 99255 IP/OBS CONSLTJ NEW/EST HI 80: CPT | Mod: GC

## 2019-08-13 RX ORDER — VALACYCLOVIR 500 MG/1
1 TABLET, FILM COATED ORAL
Qty: 20 | Refills: 0
Start: 2019-08-13 | End: 2019-08-22

## 2019-08-13 RX ADMIN — Medication 1 PATCH: at 07:12

## 2019-08-13 RX ADMIN — VALACYCLOVIR 1000 MILLIGRAM(S): 500 TABLET, FILM COATED ORAL at 06:29

## 2019-08-13 RX ADMIN — Medication 650 MILLIGRAM(S): at 07:05

## 2019-08-13 RX ADMIN — Medication 40 MILLIGRAM(S): at 06:29

## 2019-08-13 RX ADMIN — Medication 650 MILLIGRAM(S): at 06:28

## 2019-08-13 NOTE — PROGRESS NOTE ADULT - PROBLEM SELECTOR PLAN 6
-Pt reports smoking 1 ppd. Will start on nicotine patch. Can continue discussion regarding motivation to quit
-Pt reports smoking 1 ppd. Will start on nicotine patch. Can continue discussion regarding motivation to quit

## 2019-08-13 NOTE — PROGRESS NOTE ADULT - PROBLEM SELECTOR PLAN 1
-Pt with oral mucosal ulcerations and erosions with associated rash and conjunctivitis   -Broad ddx including rheumatological vs infectious vs inflammatory etiology. Clinical picture concerning for Bechets disease vs limited erythema multiforme  Unlikely Raffy Med given small surface area involvement.  -HIV negative, ESR/CRP negative. F/u cmv, ebv, MALA  -Will treat with oral topical steroid and magic mouth wash solution 4x a day. If no improvement, can trial a course of PO steroids  -liquid diet for now  -Pain control  - will request Rheumatology eval
-Pt with oral mucosal ulcerations and erosions with associated rash and conjunctivitis   -Broad ddx including rheumatological vs infectious vs inflammatory etiology. Clinical picture concerning for Bechets disease vs limited erythema multiforme  Unlikely Raffy Med given small surface area involvement.  -HIV negative, ESR/CRP negative. F/u cmv, ebv, MALA  -Dermatology eval appreciated, s/p Punch bx , Likely Erythema Multiforme Minor 2/2 HSV , Derm recommended Valtrex 1 gm BID and prednisone taper   pt to f/u with Derm for Bx and pending labs    -Pain control   Rheumatology eval appreciated, recommendations noted

## 2019-08-13 NOTE — PROGRESS NOTE ADULT - ATTENDING COMMENTS
DC home today   Patient hemodynamically stable for discharge home  Time spent in discharge process is 40min  outpt f/u with Dermatology

## 2019-08-13 NOTE — PROGRESS NOTE ADULT - SUBJECTIVE AND OBJECTIVE BOX
Patient is a 33y old  Male who presents with a chief complaint of oral ulcers, rash (12 Aug 2019 01:07)      SUBJECTIVE / OVERNIGHT EVENTS: patient seen and examined by bedside, c/o congestion in his eyes, mouth ulcers feel slightly better       MEDICATIONS  (STANDING):  enoxaparin Injectable 40 milliGRAM(s) SubCutaneous daily  FIRST- Mouthwash  BLM 5 milliLiter(s) Swish and Spit four times a day  nicotine - 21 mG/24Hr(s) Patch 1 patch Transdermal daily  triamcinolone 0.1% Oral Paste 1 Application(s) Topical three times a day    MEDICATIONS  (PRN):  acetaminophen    Suspension .. 650 milliGRAM(s) Oral every 6 hours PRN Mild Pain (1 - 3), Moderate Pain (4 - 6)  LORazepam     Tablet 2 milliGRAM(s) Oral every 2 hours PRN Symptom-triggered 2 point increase in CIWA-Ar      Vital Signs Last 24 Hrs  T(C): 36.7 (12 Aug 2019 09:51), Max: 37.6 (11 Aug 2019 20:17)  T(F): 98.1 (12 Aug 2019 09:51), Max: 99.6 (11 Aug 2019 20:17)  HR: 52 (12 Aug 2019 09:51) (52 - 91)  BP: 98/57 (12 Aug 2019 09:51) (98/57 - 122/82)  BP(mean): --  RR: 17 (12 Aug 2019 09:51) (16 - 18)  SpO2: 99% (12 Aug 2019 09:51) (99% - 100%)  CAPILLARY BLOOD GLUCOSE        I&O's Summary    12 Aug 2019 07:01  -  12 Aug 2019 13:22  --------------------------------------------------------  IN: 0 mL / OUT: 200 mL / NET: -200 mL        PHYSICAL EXAM:  GENERAL: NAD, well-developed  HEAD:  Atraumatic, Normocephalic, ulcers on the lips, tongue and buccal mucosa   EYES: EOMI, PERRLA, conjunctiva  mildly congested   CHEST/LUNG: Clear to auscultation bilaterally; No wheeze  HEART: Regular rate and rhythm; No murmurs, rubs, or gallops  ABDOMEN: Soft, Nontender, Nondistended; Bowel sounds present  EXTREMITIES:  2+ Peripheral Pulses, No clubbing, cyanosis, or edema  PSYCH: AAOx3  NEUROLOGY: non-focal  SKIN: violaceous target-like lesions with scaling on extensor surfaces of upper extremities, one popped bullae on left elbow   LABS:                        14.7   5.51  )-----------( 169      ( 12 Aug 2019 05:27 )             44.5     08-12    137  |  98  |  9   ----------------------------<  78  4.7   |  25  |  0.67    Ca    9.3      12 Aug 2019 05:27  Phos  3.4     -  Mg     1.6         TPro  6.9  /  Alb  4.2  /  TBili  1.4<H>  /  DBili  x   /  AST  42<H>  /  ALT  42<H>  /  AlkPhos  70  08-12          Urinalysis Basic - ( 12 Aug 2019 06:55 )    Color: YELLOW / Appearance: CLEAR / S.036 / pH: 6.5  Gluc: NEGATIVE / Ketone: MODERATE  / Bili: NEGATIVE / Urobili: TRACE   Blood: NEGATIVE / Protein: 20 / Nitrite: NEGATIVE   Leuk Esterase: NEGATIVE / RBC: 0-2 / WBC 3-5   Sq Epi: OCC / Non Sq Epi: x / Bacteria: NEGATIVE        RADIOLOGY & ADDITIONAL TESTS:    Imaging Personally Reviewed:    Consultant(s) Notes Reviewed:      Care Discussed with Consultants/Other Providers:
Patient is a 33y old  Male who presents with a chief complaint of oral ulcers, rash (13 Aug 2019 09:29)      SUBJECTIVE / OVERNIGHT EVENTS: patient seen and examined by bedside, pt feeling better, wants to go home today   pt dressed up  to go home       MEDICATIONS  (STANDING):  enoxaparin Injectable 40 milliGRAM(s) SubCutaneous daily  FIRST- Mouthwash  BLM 5 milliLiter(s) Swish and Spit four times a day  nicotine - 21 mG/24Hr(s) Patch 1 patch Transdermal daily  predniSONE   Tablet   Oral   predniSONE   Tablet 40 milliGRAM(s) Oral daily  triamcinolone 0.1% Oral Paste 1 Application(s) Topical three times a day  valACYclovir 1000 milliGRAM(s) Oral two times a day    MEDICATIONS  (PRN):  acetaminophen    Suspension .. 650 milliGRAM(s) Oral every 6 hours PRN Mild Pain (1 - 3), Moderate Pain (4 - 6)  LORazepam     Tablet 2 milliGRAM(s) Oral every 2 hours PRN Symptom-triggered 2 point increase in CIWA-Ar      Vital Signs Last 24 Hrs  T(C): 36.9 (13 Aug 2019 10:15), Max: 37 (12 Aug 2019 16:49)  T(F): 98.4 (13 Aug 2019 10:15), Max: 98.6 (12 Aug 2019 16:49)  HR: 66 (13 Aug 2019 10:15) (64 - 82)  BP: 106/73 (13 Aug 2019 10:15) (94/60 - 113/70)  BP(mean): --  RR: 17 (13 Aug 2019 10:15) (16 - 17)  SpO2: 99% (13 Aug 2019 10:15) (99% - 100%)  CAPILLARY BLOOD GLUCOSE        I&O's Summary    12 Aug 2019 07:01  -  13 Aug 2019 07:00  --------------------------------------------------------  IN: 0 mL / OUT: 200 mL / NET: -200 mL      PHYSICAL EXAM:  GENERAL: NAD, well-developed  HEAD:  Atraumatic, Normocephalic, ulcers on the lips, tongue and buccal mucosa   EYES: EOMI, PERRLA, conjunctiva  mildly congested   CHEST/LUNG: Clear to auscultation bilaterally; No wheeze  HEART: Regular rate and rhythm; No murmurs, rubs, or gallops  ABDOMEN: Soft, Nontender, Nondistended; Bowel sounds present  EXTREMITIES:  2+ Peripheral Pulses, No clubbing, cyanosis, or edema  PSYCH: AAOx3  NEUROLOGY: non-focal  SKIN: violaceous target-like lesions with scaling on extensor surfaces of upper extremities, one popped bullae on left elbow       LABS:                        14.7   5.51  )-----------( 169      ( 12 Aug 2019 05:27 )             44.5     08-    137  |  98  |  9   ----------------------------<  78  4.7   |  25  |  0.67    Ca    9.3      12 Aug 2019 05:27  Phos  3.4     -  Mg     1.6         TPro  6.9  /  Alb  4.2  /  TBili  1.4<H>  /  DBili  x   /  AST  42<H>  /  ALT  42<H>  /  AlkPhos  70  08-12          Urinalysis Basic - ( 12 Aug 2019 06:55 )    Color: YELLOW / Appearance: CLEAR / S.036 / pH: 6.5  Gluc: NEGATIVE / Ketone: MODERATE  / Bili: NEGATIVE / Urobili: TRACE   Blood: NEGATIVE / Protein: 20 / Nitrite: NEGATIVE   Leuk Esterase: NEGATIVE / RBC: 0-2 / WBC 3-5   Sq Epi: OCC / Non Sq Epi: x / Bacteria: NEGATIVE        RADIOLOGY & ADDITIONAL TESTS:    Imaging Personally Reviewed:    Consultant(s) Notes Reviewed:  Dermatology, Rheumatology     Care Discussed with Consultants/Other Providers:

## 2019-08-13 NOTE — PROGRESS NOTE ADULT - PROBLEM SELECTOR PLAN 3
-redness and pruritis of eye, mild pain with associated blurriness of vision. Concerning for uveitis (in the setting of associated rash and oral ulcers) vs infectious etiology (viral, bacterial)  - will request Rheum eval
-redness and pruritis of eye, mild pain with associated blurriness of vision. Concerning for uveitis (in the setting of associated rash and oral ulcers) vs infectious etiology (viral, bacterial)  - Rheum eval appreciated  -outpt f/u

## 2019-08-13 NOTE — PROGRESS NOTE ADULT - PROBLEM SELECTOR PLAN 4
-related to underlying systemic/rheum/infectious issue vs 2/2 to recent valacyclovir use  -Hepatitis pane negative   - liver enzymes trending down, monitor CMP, if worsening consider abdominal US for further evaluation
-related to underlying systemic/rheum/infectious issue vs 2/2 to recent valacyclovir use  -Hepatitis pane negative   - liver enzymes trending down,  - will recommend repeat LFTs as outpt

## 2019-08-13 NOTE — PROGRESS NOTE ADULT - PROBLEM SELECTOR PLAN 5
-endorses drinking nightly. Denies history of withdrawal symptoms. Currently no symptoms of withdrawal however given volume of reported drinking, will place on symptom triggered CIWA  -SBIRT
-endorses drinking nightly. Denies history of withdrawal symptoms. Currently no symptoms of withdrawal however given volume of reported drinking, will place on symptom triggered CIWA  -SBIRT  -Pt counselled about risk of continued alcohol abuse and advised not to drink while on steroids

## 2019-08-13 NOTE — DISCHARGE NOTE PROVIDER - NSDCCPCAREPLAN_GEN_ALL_CORE_FT
PRINCIPAL DISCHARGE DIAGNOSIS  Diagnosis: Rash  Assessment and Plan of Treatment: HSV  continue prednisone as ordered and valtrex as ordered   follow up with dermatology for further evaluation and management.      SECONDARY DISCHARGE DIAGNOSES  Diagnosis: Ulceration of oral mucosa  Assessment and Plan of Treatment: HSV  continue prednisone as ordered and valtrex as ordered   follow up with dermatology for further evaluation and management. PRINCIPAL DISCHARGE DIAGNOSIS  Diagnosis: Rash  Assessment and Plan of Treatment: HSV  continue prednisone as ordered and valtrex as ordered   follow up with dermatology for further evaluation and management.  [facility], unable to [facility]cribe   your pharmacy. [facility] has your prescriptions they need your insurance to fill them      SECONDARY DISCHARGE DIAGNOSES  Diagnosis: Ulceration of oral mucosa  Assessment and Plan of Treatment: HSV  continue prednisone as ordered and valtrex as ordered   follow up with dermatology for further evaluation and management.    Diagnosis: Transaminitis  Assessment and Plan of Treatment: Repeat LFT in 1 week  call PCP for follow up

## 2019-08-13 NOTE — PROGRESS NOTE ADULT - ASSESSMENT
32 y/o M with no significant PMH presents to the ED with oral ulcers, rash and red eye admitted for further w/u
34 y/o M with no significant PMH presents to the ED with oral ulcers, rash and red eye admitted for further w/u

## 2019-08-13 NOTE — DISCHARGE NOTE PROVIDER - HOSPITAL COURSE
34 y/o M with no significant PMH presents to the ED with oral ulcers, rash forearms. Pt reports onset of ulcers on lip about 2 weeks ago. He was prescribed valacyclovir and took it for about 2-3 days but discontinued due to improvement. Pt reports shortly after stopping anti-viral therapy, he had recurrence of ulcers on lips inner cheek and on tongue. Patient developed rash on forearms and elbows 4 days ago and patient decided to come in for evaluation.         Dermatology consulted for etiology of the oral findings and rash on forearms/elbows. [place] biopsy taken. Prednione taper 40mg x 4 days 30mg x 4 days 20mg x 4 days 10 mg x 4 days. and patient restart Valtrex 1000mg BID. Patient to follow up with derm as outpatien 32 y/o M with no significant PMH presents to the ED with oral ulcers, rash forearms. Pt reports onset of ulcers on lip about 2 weeks ago. He was prescribed valacyclovir and took it for about 2-3 days but discontinued due to improvement. Pt reports shortly after stopping anti-viral therapy, he had recurrence of ulcers on lips inner cheek and on tongue. Patient developed rash on forearms and elbows 4 days ago and patient decided to come in for evaluation.         Dermatology consulted for etiology of the oral findings and rash on forearms/elbows. [place] biopsy taken. Prednione taper 40mg x 4 days 30mg x 4 days 20mg x 4 days 10 mg x 4 days. and patient restart Valtrex 1000mg BID. Patient to follow up with derm as outpatient    Pt to follow up with PCP for repeat LFTs in 1 week         prednisone taper called into [facility], unable to eprescribe

## 2019-08-13 NOTE — PROGRESS NOTE ADULT - PROBLEM SELECTOR PLAN 2
-violaceous target-like lesions with scaling on extensor surfaces of upper extremities. DDx includes erythema multiforme vs psoriasis vs pemphigus vs atopic dermatitis   -Consider derm eval for skin biopsy to further aid in diagnosis
-violaceous target-like lesions with scaling on extensor surfaces of upper extremities. DDx includes erythema multiforme vs psoriasis vs pemphigus vs atopic dermatitis   - derm eval noted, s/p  skin biopsy   c/w management as above

## 2019-08-13 NOTE — DISCHARGE NOTE NURSING/CASE MANAGEMENT/SOCIAL WORK - NSDCDPATPORTLINK_GEN_ALL_CORE
You can access the [facility] Patient Portal, offered by [facility], by registering with the following website: [email or web address]

## 2019-08-13 NOTE — DISCHARGE NOTE PROVIDER - NSFOLLOWUPCLINICS_GEN_ALL_ED_FT
[facility] - [place]  [facility]at[facility]  [address], [address]  [place] [address]  Phone: [phone]  Fax: [phone]  Follow Up Time: 7-10 Days [facility] - [place]  Dermatology  [address], [address]  [place] [address]  Phone: [phone]  Fax: [phone]  Follow Up Time: 7-10 Days    [facility] General Internal Medicine  General Internal Medicine  [address] [place] [address]  Phone: [phone]  Fax:   Follow Up Time: 7-10 Days

## 2019-08-13 NOTE — CHART NOTE - NSCHARTNOTEFT_GEN_A_CORE
Pt medically cleared for discharge today, discussed with Dr. Orestes Pandey recs followed, prednisone prescription called in unable to eprescribe to [facility] pharmacy Valtrex sent electronically

## 2019-08-14 LAB — ANA TITR SER: NEGATIVE — SIGNIFICANT CHANGE UP

## 2019-08-15 LAB — SURGICAL PATHOLOGY STUDY: SIGNIFICANT CHANGE UP

## 2019-09-13 ENCOUNTER — EMERGENCY (EMERGENCY)
Facility: HOSPITAL | Age: 34
LOS: 1 days | Discharge: HOME | End: 2019-09-13
Admitting: EMERGENCY MEDICINE
Payer: MEDICAID

## 2019-09-13 VITALS
DIASTOLIC BLOOD PRESSURE: 64 MMHG | SYSTOLIC BLOOD PRESSURE: 96 MMHG | RESPIRATION RATE: 81 BRPM | HEART RATE: 81 BPM | OXYGEN SATURATION: 100 % | TEMPERATURE: 98 F

## 2019-09-13 VITALS
SYSTOLIC BLOOD PRESSURE: 109 MMHG | DIASTOLIC BLOOD PRESSURE: 74 MMHG | RESPIRATION RATE: 18 BRPM | HEART RATE: 77 BPM | OXYGEN SATURATION: 100 %

## 2019-09-13 PROCEDURE — 99283 EMERGENCY DEPT VISIT LOW MDM: CPT

## 2019-09-13 RX ORDER — VALACYCLOVIR 500 MG/1
1 TABLET, FILM COATED ORAL
Qty: 20 | Refills: 0
Start: 2019-09-13 | End: 2019-09-22

## 2019-09-13 RX ORDER — VALACYCLOVIR 500 MG/1
1 TABLET, FILM COATED ORAL
Qty: 20 | Refills: 0
Start: 2019-09-13 | End: 2019-11-16

## 2019-09-13 RX ORDER — VALACYCLOVIR 500 MG/1
1000 TABLET, FILM COATED ORAL ONCE
Refills: 0 | Status: COMPLETED | OUTPATIENT
Start: 2019-09-13 | End: 2019-09-13

## 2019-09-13 RX ADMIN — VALACYCLOVIR 1000 MILLIGRAM(S): 500 TABLET, FILM COATED ORAL at 12:26

## 2019-09-13 NOTE — ED PROVIDER NOTE - CLINICAL SUMMARY MEDICAL DECISION MAKING FREE TEXT BOX
34 y/o male c/o oral lesions x2-3 days- likely recurrent herpes infection as pt did not finish his course of valtrex last month. WIll restart valtrex and speak with derm to arrange close f/u.

## 2019-09-13 NOTE — ED PROVIDER NOTE - OBJECTIVE STATEMENT
34 y/o male no pmh c/o oral lesions. Pt was admitted to [facility] x1 month ago for similar presentation. Pt was seen by rheum and derm, dx w. likely erythema multiform minor 2/2 HSV infection and was started on prednisone taper and valtrex and dc. Pt admits to taking 3-4 days of the meds and rash improved so he stopped the meds. Pt now c/o recurring lesions to lips and oral mucosa over the last 2-3 days. pt took 1 dose of left over valtrex yesterday. Pt denies any other complaints. Pt states that lesions are not as severe as the were last month. Denies chest pain, sob, difficulty swallowing or breathing, n/v/d, fever or chills.

## 2019-09-13 NOTE — ED PROVIDER NOTE - PHYSICAL EXAMINATION
R lower lip- large ulcerative lesion, no active drainage, no erythema + sensitive to touch.  Mouth- multiple ulcerative lesions to gingiva and under tongue.

## 2019-09-13 NOTE — ED ADULT TRIAGE NOTE - CHIEF COMPLAINT QUOTE
Pt states since January he has been getting infections and open wounds with yellowish drainage on his lips. Pt states infection had cleared up but started getting bad a few days ago. States he was tested for herpes and it was negative, told he has a blood infection, does not know that name.

## 2019-09-13 NOTE — ED PROVIDER NOTE - NSFOLLOWUPCLINICS_GEN_ALL_ED_FT
[facility] D[facility]  Dermatology  [address], [address]  [place] [address]  Phone: [phone]  Fax:   Follow Up Time:

## 2019-09-13 NOTE — ED PROVIDER NOTE - PATIENT PORTAL LINK FT
You can access the FollowMyHealth Patient Portal offered by [facility] by registering at the following website: [email or web address]. By joining [employer]’s FollowMyHealth portal, you will also be able to view your health information using other applications (apps) compatible with our system.

## 2019-09-13 NOTE — ED ADULT NURSE NOTE - OBJECTIVE STATEMENT
Pt presents to intake, A&Ox4, ambulatory w/o assistance, here for evaluation of sores on mouth, pt states "I was told I had a blood infection, I was given the pills you will give me today and it cleared up but I stopped taking them and did not finish them completely." Denies chest pain, shortness of breath, palpitations, diaphoresis, headaches, fevers, dizziness, nausea, vomiting, diarrhea, or urinary symptoms at this time. No IV at this time as per MD orders, meds given as ordered. Will continue to monitor.

## 2019-09-18 NOTE — DIETITIAN INITIAL EVALUATION ADULT. - PROBLEM SELECTOR PLAN 5
-endorses drinking nightly. Denies history of withdrawal symptoms. Currently no symptoms of withdrawal however given volume of reported drinking, will place on symptom triggered CIWA  -SBIRT in am
Negative

## 2019-11-01 ENCOUNTER — OUTPATIENT (OUTPATIENT)
Dept: OUTPATIENT SERVICES | Facility: HOSPITAL | Age: 34
LOS: 1 days | End: 2019-11-01
Payer: COMMERCIAL

## 2019-11-07 ENCOUNTER — EMERGENCY (EMERGENCY)
Facility: HOSPITAL | Age: 34
LOS: 1 days | Discharge: HOME | End: 2019-11-07
Admitting: EMERGENCY MEDICINE
Payer: MEDICAID

## 2019-11-07 VITALS
DIASTOLIC BLOOD PRESSURE: 73 MMHG | SYSTOLIC BLOOD PRESSURE: 113 MMHG | RESPIRATION RATE: 16 BRPM | TEMPERATURE: 98 F | HEART RATE: 94 BPM | OXYGEN SATURATION: 100 %

## 2019-11-07 PROCEDURE — 99283 EMERGENCY DEPT VISIT LOW MDM: CPT

## 2019-11-07 RX ORDER — VALACYCLOVIR 500 MG/1
1000 TABLET, FILM COATED ORAL ONCE
Refills: 0 | Status: COMPLETED | OUTPATIENT
Start: 2019-11-07 | End: 2019-11-07

## 2019-11-07 RX ADMIN — VALACYCLOVIR 1000 MILLIGRAM(S): 500 TABLET, FILM COATED ORAL at 23:25

## 2019-11-07 RX ADMIN — Medication 50 MILLIGRAM(S): at 23:25

## 2019-11-07 RX ADMIN — Medication 50 MILLIGRAM(S): at 23:15

## 2019-11-07 RX ADMIN — VALACYCLOVIR 1000 MILLIGRAM(S): 500 TABLET, FILM COATED ORAL at 23:15

## 2019-11-07 NOTE — ED PROVIDER NOTE - PATIENT PORTAL LINK FT
You can access the FollowMyHealth Patient Portal offered by [facility] by registering at the following website: [email or web address]. By joining [employer]’s FollowMyHealth portal, you will also be able to view your health information using other applications (apps) compatible with our system.

## 2019-11-07 NOTE — ED PROVIDER NOTE - OBJECTIVE STATEMENT
32y/o Male no pmh c/o oral lesions. Pt was admitted to [facility] in August 2019 for similar presentation. Pt was seen by rheum and derm, dx w. likely erythema multiform minor 2/2 HSV infection and was started on prednisone taper and valtrex and dc. Pt presented after not taking full course of Prednisone/Valtrex when oral lesions re-appeared in September, was supposed to f/u with Derm but did not as lesions cleared. Pt now c/o recurring lesions to lips and oral mucosa over the last 4 days. Pt denies any other complaints. Denies chest pain, sob, difficulty swallowing or breathing, n/v/d, fever or chills.

## 2019-11-07 NOTE — ED PROVIDER NOTE - ENMT, MLM
Airway patent, Nasal mucosa clear. Lesion noted to upper lip and inner bottom lip on mucosa. Throat has no vesicles, no oropharyngeal exudates and uvula is midline.

## 2019-11-07 NOTE — ED PROVIDER NOTE - CARE PLAN
Principal Discharge DX:	HSV-1 (herpes simplex virus 1) infection  Assessment and plan of treatment:	Advance activity as tolerated.  Continue all previously prescribed medications as directed unless otherwise instructed.  Follow up with your primary care physician in 48-72 hours- bring copies of your results.  Return to the ER for worsening or persistent symptoms, and/or ANY NEW OR CONCERNING SYMPTOMS. If you have issues obtaining follow up, please call: [phone] to obtain a doctor or specialist who takes your insurance in your area.  You may call [phone] to make an appointment with the internal medicine clinic.

## 2019-11-07 NOTE — ED ADULT TRIAGE NOTE - CHIEF COMPLAINT QUOTE
pt w cold sore/wound to upper lip. was on Valtrex and prednisone a month ago for same symptoms which resolved and now came back. pt w cold sore/wound to upper lip. was on Valtrex and prednisone a month ago for same symptoms which resolved and now came back. pt was supposed to follow up w a dermatologist but did not go "because it went away"

## 2019-11-07 NOTE — ED PROVIDER NOTE - NSFOLLOWUPCLINICS_GEN_ALL_ED_FT
[facility] Dermatolgy  Dermatology  [address], [address]  [place] [address]  Phone: [phone]  Fax:   Follow Up Time:

## 2019-11-07 NOTE — ED PROVIDER NOTE - PROGRESS NOTE DETAILS
Dr Mclaughlin: This patient was not seen by me nor discussed with me. I was available for consultation from the PA/NP but was not approached. I signed the chart per hospital policy.

## 2019-11-07 NOTE — ED PROVIDER NOTE - NSFOLLOWUPINSTRUCTIONS_ED_ALL_ED_FT
Advance activity as tolerated.  Continue all previously prescribed medications as directed unless otherwise instructed.  Follow up with your primary care physician in 48-72 hours- bring copies of your results.  Return to the ER for worsening or persistent symptoms, and/or ANY NEW OR CONCERNING SYMPTOMS. If you have issues obtaining follow up, please call: [phone] to obtain a doctor or specialist who takes your insurance in your area.  You may call [phone] to make an appointment with the internal medicine clinic.

## 2019-11-07 NOTE — ED PROVIDER NOTE - PLAN OF CARE
Advance activity as tolerated.  Continue all previously prescribed medications as directed unless otherwise instructed.  Follow up with your primary care physician in 48-72 hours- bring copies of your results.  Return to the ER for worsening or persistent symptoms, and/or ANY NEW OR CONCERNING SYMPTOMS. If you have issues obtaining follow up, please call: [phone] to obtain a doctor or specialist who takes your insurance in your area.  You may call [phone] to make an appointment with the internal medicine clinic.

## 2019-12-01 PROCEDURE — G9001: CPT

## 2019-12-06 DIAGNOSIS — Z71.89 OTHER SPECIFIED COUNSELING: ICD-10-CM

## 2019-12-26 ENCOUNTER — EMERGENCY (EMERGENCY)
Facility: HOSPITAL | Age: 34
LOS: 1 days | Discharge: HOME | End: 2019-12-26
Attending: EMERGENCY MEDICINE | Admitting: EMERGENCY MEDICINE
Payer: MEDICAID

## 2019-12-26 VITALS
OXYGEN SATURATION: 100 % | RESPIRATION RATE: 18 BRPM | TEMPERATURE: 99 F | DIASTOLIC BLOOD PRESSURE: 77 MMHG | HEART RATE: 94 BPM | SYSTOLIC BLOOD PRESSURE: 123 MMHG

## 2019-12-26 PROCEDURE — 99283 EMERGENCY DEPT VISIT LOW MDM: CPT

## 2019-12-26 RX ORDER — OFLOXACIN 0.3 %
2 DROPS OPHTHALMIC (EYE)
Qty: 1 | Refills: 0
Start: 2019-12-26 | End: 2019-12-30

## 2019-12-26 RX ORDER — ERYTHROMYCIN BASE 5 MG/GRAM
1 OINTMENT (GRAM) OPHTHALMIC (EYE)
Qty: 1 | Refills: 0
Start: 2019-12-26 | End: 2020-01-01

## 2019-12-26 RX ORDER — BACITRACIN 500 [USP'U]/G
1 OINTMENT OPHTHALMIC
Qty: 15 | Refills: 0
Start: 2019-12-26 | End: 2020-01-01

## 2019-12-26 RX ORDER — VALACYCLOVIR 500 MG/1
1 TABLET, FILM COATED ORAL
Qty: 20 | Refills: 0
Start: 2019-12-26 | End: 2020-01-04

## 2019-12-26 RX ORDER — OFLOXACIN 0.3 %
2 DROPS OPHTHALMIC (EYE)
Qty: 0 | Refills: 0 | DISCHARGE
Start: 2019-12-26 | End: 2019-12-30

## 2019-12-26 RX ORDER — MOXIFLOXACIN HCL 0.5 %
1 DROPS OPHTHALMIC (EYE)
Qty: 30 | Refills: 0
Start: 2019-12-26 | End: 2020-01-01

## 2019-12-26 NOTE — ED PROVIDER NOTE - PATIENT PORTAL LINK FT
You can access the FollowMyHealth Patient Portal offered by [facility] by registering at the following website: [email or web address]. By joining [employer]’s FollowMyHealth portal, you will also be able to view your health information using other applications (apps) compatible with our system.

## 2019-12-26 NOTE — ED PROVIDER NOTE - PROGRESS NOTE DETAILS
Pt refusing dermatology consult despite them being available to come and wants to followup as outpt- they understand diagnosis not clear and medications being given but needs followup

## 2019-12-26 NOTE — ED POST DISCHARGE NOTE - ADDITIONAL DOCUMENTATION
The pharmacy called - vigamox not covered - will change to ofloxacin. The pharmacy also states that the bacitracin was backordered - rx changed to erythromycin. Discussed with Dr. Pacheco (original provider)

## 2019-12-26 NOTE — ED PROVIDER NOTE - ENMT, MLM
Airway patent, Nasal mucosa clear. Mouth with normal mucosa. Throat has no vesicles, no oropharyngeal exudates and uvula is midline. Airway patent, Nasal mucosa clear. Mouth with oral ulcerations/vesicles mild under tongue and in cheek left side, lips wnl

## 2019-12-26 NOTE — ED PROVIDER NOTE - OBJECTIVE STATEMENT
Pt with h/o oral blisters presenting again with blisters for a few days similar to prior visits. Also small small bumps on the elbows, no fevers.  Also c/o left eye redness, no blurry vision but with irritation.  Some discharge intermittent, whitish.

## 2019-12-26 NOTE — ED PROVIDER NOTE - CLINICAL SUMMARY MEDICAL DECISION MAKING FREE TEXT BOX
Pt with h/o prior oral ulcerations and rash, in old chart review with possible erythema multiforme, herpes simplex- pt never followed up and does not want to stay or get labs for further evaluation.  Endorses to daily alcohol use- has had mildly elevated liver enzymes in the past.    Will treat with Valtrex as has had in the past.  Will consider steroids as well as patient does not have good consistent followup.  Dermatology paged but unavailable today- will give Derm referral and pmd.  Should also follow with rheumatology. Pt explained risks of leaving and not receiving further followup. Wife is with him and will ascertain followup.

## 2019-12-26 NOTE — ED PROVIDER NOTE - CONSTITUTIONAL NEGATIVE STATEMENT, MLM
Persistent Nausea and Vomiting/Unable to Urinate/Pain not relieved by Medications/Swelling that continues/Numbness, color, or temperature change to extremity/Excessive Diarrhea/Increased Irritability or Sluggishness/Bleeding that does not stop/Numbness, tingling/Inability to Tolerate Liquids or Foods/Fever greater than 101 no fever and no chills.

## 2019-12-26 NOTE — ED PROVIDER NOTE - NSFOLLOWUPINSTRUCTIONS_ED_ALL_ED_FT
Valtrex 1 gm twice a day for 10 days.  Prednisone 50 mg a day for 5 days.  Vigamox 1 drop twice a day for 7 days.  Bacitracin opthalmic ointment to left eye at night for 7 days.  If worse rash, fever, eye redness or pain, worse oral ulcers, or any worse symptoms return to the ER.  Avoid work while eye is red and wash hands frequently.  Drink plenty of fluid such as gatorade and return for any concerns.  Followup with your primary care doctor tomorrow and Dermatology and Opthalmology within the next few days.

## 2020-01-23 ENCOUNTER — EMERGENCY (EMERGENCY)
Facility: HOSPITAL | Age: 35
LOS: 1 days | Discharge: HOME | End: 2020-01-23
Attending: EMERGENCY MEDICINE | Admitting: EMERGENCY MEDICINE
Payer: MEDICAID

## 2020-01-23 VITALS
TEMPERATURE: 99 F | OXYGEN SATURATION: 99 % | RESPIRATION RATE: 15 BRPM | HEART RATE: 78 BPM | DIASTOLIC BLOOD PRESSURE: 72 MMHG | SYSTOLIC BLOOD PRESSURE: 118 MMHG

## 2020-01-23 VITALS
HEART RATE: 103 BPM | SYSTOLIC BLOOD PRESSURE: 121 MMHG | OXYGEN SATURATION: 100 % | DIASTOLIC BLOOD PRESSURE: 76 MMHG | TEMPERATURE: 99 F | RESPIRATION RATE: 22 BRPM

## 2020-01-23 PROBLEM — Z87.2 PERSONAL HISTORY OF DISEASES OF THE SKIN AND SUBCUTANEOUS TISSUE: Chronic | Status: ACTIVE | Noted: 2019-12-26

## 2020-01-23 PROBLEM — R21 RASH AND OTHER NONSPECIFIC SKIN ERUPTION: Chronic | Status: ACTIVE | Noted: 2019-12-26

## 2020-01-23 LAB
ALBUMIN SERPL ELPH-MCNC: 4.8 G/DL — SIGNIFICANT CHANGE UP (ref 3.3–5)
ALP SERPL-CCNC: 72 U/L — SIGNIFICANT CHANGE UP (ref 40–120)
ALT FLD-CCNC: 21 U/L — SIGNIFICANT CHANGE UP (ref 4–41)
ANION GAP SERPL CALC-SCNC: 20 MMO/L — HIGH (ref 7–14)
AST SERPL-CCNC: 26 U/L — SIGNIFICANT CHANGE UP (ref 4–40)
BASE EXCESS BLDV CALC-SCNC: 1.1 MMOL/L — SIGNIFICANT CHANGE UP
BASOPHILS # BLD AUTO: 0.03 K/UL — SIGNIFICANT CHANGE UP (ref 0–0.2)
BASOPHILS NFR BLD AUTO: 0.3 % — SIGNIFICANT CHANGE UP (ref 0–2)
BILIRUB SERPL-MCNC: 0.8 MG/DL — SIGNIFICANT CHANGE UP (ref 0.2–1.2)
BLOOD GAS VENOUS - CREATININE: 0.63 MG/DL — SIGNIFICANT CHANGE UP (ref 0.5–1.3)
BLOOD GAS VENOUS - FIO2: 21 — SIGNIFICANT CHANGE UP
BUN SERPL-MCNC: 7 MG/DL — SIGNIFICANT CHANGE UP (ref 7–23)
CALCIUM SERPL-MCNC: 9.2 MG/DL — SIGNIFICANT CHANGE UP (ref 8.4–10.5)
CHLORIDE BLDV-SCNC: 99 MMOL/L — SIGNIFICANT CHANGE UP (ref 96–108)
CHLORIDE SERPL-SCNC: 93 MMOL/L — LOW (ref 98–107)
CO2 SERPL-SCNC: 21 MMOL/L — LOW (ref 22–31)
CREAT SERPL-MCNC: 0.75 MG/DL — SIGNIFICANT CHANGE UP (ref 0.5–1.3)
EOSINOPHIL # BLD AUTO: 0.21 K/UL — SIGNIFICANT CHANGE UP (ref 0–0.5)
EOSINOPHIL NFR BLD AUTO: 2.2 % — SIGNIFICANT CHANGE UP (ref 0–6)
GAS PNL BLDV: 133 MMOL/L — LOW (ref 136–146)
GLUCOSE BLDV-MCNC: 95 MG/DL — SIGNIFICANT CHANGE UP (ref 70–99)
GLUCOSE SERPL-MCNC: 93 MG/DL — SIGNIFICANT CHANGE UP (ref 70–99)
HCO3 BLDV-SCNC: 25 MMOL/L — SIGNIFICANT CHANGE UP (ref 20–27)
HCT VFR BLD CALC: 43.8 % — SIGNIFICANT CHANGE UP (ref 39–50)
HCT VFR BLDV CALC: 46.7 % — SIGNIFICANT CHANGE UP (ref 39–51)
HGB BLD-MCNC: 14.7 G/DL — SIGNIFICANT CHANGE UP (ref 13–17)
HGB BLDV-MCNC: 15.2 G/DL — SIGNIFICANT CHANGE UP (ref 13–17)
IMM GRANULOCYTES NFR BLD AUTO: 0.4 % — SIGNIFICANT CHANGE UP (ref 0–1.5)
LACTATE BLDV-MCNC: 1.2 MMOL/L — SIGNIFICANT CHANGE UP (ref 0.5–2)
LIDOCAIN IGE QN: 20.6 U/L — SIGNIFICANT CHANGE UP (ref 7–60)
LYMPHOCYTES # BLD AUTO: 0.6 K/UL — LOW (ref 1–3.3)
LYMPHOCYTES # BLD AUTO: 6.3 % — LOW (ref 13–44)
MCHC RBC-ENTMCNC: 31.7 PG — SIGNIFICANT CHANGE UP (ref 27–34)
MCHC RBC-ENTMCNC: 33.6 % — SIGNIFICANT CHANGE UP (ref 32–36)
MCV RBC AUTO: 94.4 FL — SIGNIFICANT CHANGE UP (ref 80–100)
MONOCYTES # BLD AUTO: 0.62 K/UL — SIGNIFICANT CHANGE UP (ref 0–0.9)
MONOCYTES NFR BLD AUTO: 6.5 % — SIGNIFICANT CHANGE UP (ref 2–14)
NEUTROPHILS # BLD AUTO: 8.1 K/UL — HIGH (ref 1.8–7.4)
NEUTROPHILS NFR BLD AUTO: 84.3 % — HIGH (ref 43–77)
NRBC # FLD: 0 K/UL — SIGNIFICANT CHANGE UP (ref 0–0)
PCO2 BLDV: 37 MMHG — LOW (ref 41–51)
PH BLDV: 7.44 PH — HIGH (ref 7.32–7.43)
PLATELET # BLD AUTO: 162 K/UL — SIGNIFICANT CHANGE UP (ref 150–400)
PMV BLD: 9.7 FL — SIGNIFICANT CHANGE UP (ref 7–13)
PO2 BLDV: 50 MMHG — HIGH (ref 35–40)
POTASSIUM BLDV-SCNC: 3.6 MMOL/L — SIGNIFICANT CHANGE UP (ref 3.4–4.5)
POTASSIUM SERPL-MCNC: 4 MMOL/L — SIGNIFICANT CHANGE UP (ref 3.5–5.3)
POTASSIUM SERPL-SCNC: 4 MMOL/L — SIGNIFICANT CHANGE UP (ref 3.5–5.3)
PROT SERPL-MCNC: 7.7 G/DL — SIGNIFICANT CHANGE UP (ref 6–8.3)
RBC # BLD: 4.64 M/UL — SIGNIFICANT CHANGE UP (ref 4.2–5.8)
RBC # FLD: 14.3 % — SIGNIFICANT CHANGE UP (ref 10.3–14.5)
SAO2 % BLDV: 85 % — SIGNIFICANT CHANGE UP (ref 60–85)
SODIUM SERPL-SCNC: 134 MMOL/L — LOW (ref 135–145)
WBC # BLD: 9.6 K/UL — SIGNIFICANT CHANGE UP (ref 3.8–10.5)
WBC # FLD AUTO: 9.6 K/UL — SIGNIFICANT CHANGE UP (ref 3.8–10.5)

## 2020-01-23 PROCEDURE — 99285 EMERGENCY DEPT VISIT HI MDM: CPT

## 2020-01-23 RX ORDER — SODIUM CHLORIDE 9 MG/ML
1000 INJECTION INTRAMUSCULAR; INTRAVENOUS; SUBCUTANEOUS ONCE
Refills: 0 | Status: COMPLETED | OUTPATIENT
Start: 2020-01-23 | End: 2020-01-23

## 2020-01-23 RX ORDER — MORPHINE SULFATE 50 MG/1
4 CAPSULE, EXTENDED RELEASE ORAL ONCE
Refills: 0 | Status: DISCONTINUED | OUTPATIENT
Start: 2020-01-23 | End: 2020-01-23

## 2020-01-23 RX ORDER — LIDOCAINE 4 G/100G
10 CREAM TOPICAL ONCE
Refills: 0 | Status: COMPLETED | OUTPATIENT
Start: 2020-01-23 | End: 2020-01-23

## 2020-01-23 RX ORDER — FAMOTIDINE 10 MG/ML
20 INJECTION INTRAVENOUS ONCE
Refills: 0 | Status: COMPLETED | OUTPATIENT
Start: 2020-01-23 | End: 2020-01-23

## 2020-01-23 RX ORDER — ONDANSETRON 8 MG/1
4 TABLET, FILM COATED ORAL ONCE
Refills: 0 | Status: COMPLETED | OUTPATIENT
Start: 2020-01-23 | End: 2020-01-23

## 2020-01-23 RX ORDER — LIDOCAINE 4 G/100G
10 CREAM TOPICAL ONCE
Refills: 0 | Status: DISCONTINUED | OUTPATIENT
Start: 2020-01-23 | End: 2020-01-23

## 2020-01-23 RX ADMIN — ONDANSETRON 4 MILLIGRAM(S): 8 TABLET, FILM COATED ORAL at 18:37

## 2020-01-23 RX ADMIN — LIDOCAINE 10 MILLILITER(S): 4 CREAM TOPICAL at 18:45

## 2020-01-23 RX ADMIN — SODIUM CHLORIDE 1000 MILLILITER(S): 9 INJECTION INTRAMUSCULAR; INTRAVENOUS; SUBCUTANEOUS at 18:11

## 2020-01-23 RX ADMIN — Medication 30 MILLILITER(S): at 18:36

## 2020-01-23 RX ADMIN — MORPHINE SULFATE 4 MILLIGRAM(S): 50 CAPSULE, EXTENDED RELEASE ORAL at 18:37

## 2020-01-23 RX ADMIN — MORPHINE SULFATE 4 MILLIGRAM(S): 50 CAPSULE, EXTENDED RELEASE ORAL at 19:08

## 2020-01-23 RX ADMIN — FAMOTIDINE 20 MILLIGRAM(S): 10 INJECTION INTRAVENOUS at 18:36

## 2020-01-23 NOTE — ED PROVIDER NOTE - PHYSICAL EXAMINATION
PHYSICAL EXAM:  GENERAL: looks uncomfortable, conversant, v  HEAD:  Atraumatic, Normocephalic  EYES: EOMI, PERRLA, conjunctiva and sclera clear  ENMT: No tonsillar erythema, exudates, or enlargement; Moist mucous membranes  NECK: Supple, No JVD, Normal thyroid  HEART: Regular rate and rhythm; No murmurs, rubs, or gallops  RESPIRATORY: CTA B/L, No W/R/R  ABDOMEN: Soft, Nontender, Nondistended; Bowel sounds present  NEURO: A&Ox3, nonfocal, moving all extremities  EXTREMITIES:  2+ Peripheral Pulses, No clubbing, cyanosis, or edema  SKIN: warm, dry, normal color, no rash or abnormal lesions PHYSICAL EXAM:  GENERAL: looks uncomfortable, conversant, tachy, alert  HEAD:  Atraumatic, Normocephalic  EYES: EOMI, PERRLA, conjunctiva and sclera clear  ENMT: No tonsillar erythema, exudates, or enlargement; Moist mucous membranes  NECK: Supple, No JVD,  HEART: tachy, no murmurs, regular   RESPIRATORY: CTA B/L, No W/R/R  ABDOMEN: Soft, Nontender, Nondistended, no guarding/rebound   BACK: no cva or midline ttp, normal ROM  NEURO: A&Ox3, nonfocal, moving all extremities, PERRL, 5/5 strength in extremities, normal speech/memory, not tremulous   EXTREMITIES:  2+ Peripheral Pulses, No clubbing, cyanosis, or edema  SKIN: warm, dry, normal color, no rash

## 2020-01-23 NOTE — ED PROVIDER NOTE - OBJECTIVE STATEMENT
34 yoM, PMHx of daily etoh use, elevated LFTs in past presents to ED with zen with report of epigastric pain, NVD since binge drinking last night. State she drinks 1/2 pint daily, but full pint last night. Zen does states he underestimates drinking. Vomiting x3 with some blood noticed. Also with stool. No blood in stool. Pain radiates to back. No CP, fever/chills or rash. Has tried rehab in past but relapsed. Smokes tobacco/marijuana. No other illicit substance use. No withdrawal sx in past. No fall or trauma. 34 yoM, c hx of daily etoh use, elevated LFTs in past presents to ED with george with report of epigastric pain, NVD since binge drinking last night. State he drinks 1/2 pint daily, but full pint last night. Fiance states he underestimates drinking. Vomiting x3 with some blood noticed. Also with stool. No blood in stool. Pain radiates to back. No CP, fever/chills or rash. Has tried rehab in past but relapsed. Smokes tobacco/marijuana. No other illicit substance use. No withdrawal sx in past. No fall or trauma.  Fiance states pt has 12 and 13 yr old children, but they live with grandparents and not with pt. Pt is declining detox.

## 2020-01-23 NOTE — ED PROVIDER NOTE - ATTENDING CONTRIBUTION TO CARE
Attending Attestation: Dr. Sutton  I have personally performed a history and physical examination of the patient and discussed management with the resident as well as the patient.  I reviewed the resident's note and agree with the documented findings and plan of care.  I have authored and modified critical sections of the Provider Note, including but not limited to HPI, Physical Exam and MDM. 34 yoM, daily EtOH user, binged last night, some blood in emesis today, epigastric pain, no withdrawal hx. tachy, mild epigastric ttp, abd otherwise soft, not tremulous, normal neuro exam. Eval for pancreatitis, dehydration, check Hgb, treat for possible alcoholic gastritis.   provide SW resources and make sure child safety addressed given 2 young children  dispo pending w/u

## 2020-01-23 NOTE — ED PROVIDER NOTE - NSFOLLOWUPINSTRUCTIONS_ED_ALL_ED_FT
Activities as tolerated. Please encourage good oral and fluid intake. For pain, please take Motrin 400mg every 4 hours as needed, or Tylenol 650mg every 6 hours as needed.    For abdominal discomfort, please take pepcid 20mg twice a day, and/or Mylanta as directed.    Please see your primary care doctor within 24-48 hours for further management of your symptoms.    Please seek emergent medical management if you have any worsening signs or symptoms, such as seizures, chest pain, difficulty breathing, loss of consciousness, or persistent vomiting.

## 2020-01-23 NOTE — PROVIDER CONTACT NOTE (OTHER) - ASSESSMENT
Pt is here for detox. Met with pt, offered referral and transfer for inpatient rehab, but pt declined, said he will stop himself. pt asked for information, provided. He said he lives alone in an apartment, has 2 children 13 and 14, they live with his grandmother.

## 2020-01-23 NOTE — ED ADULT NURSE NOTE - OBJECTIVE STATEMENT
Pt received to room 25 complaining of vomiting, abdominal pain, and diarrhea since this morning. AxOx3 and ambulates independently. Pt endorses blood in vomit. Pt has vomited 3 times since this morning. Pt states that he drank a pint of liquor last night. Pt states he drinks half a pint of alcohol every day. Pt states he has been drinking for 10 years and went to rehab around 5 or 6 years ago. Pt states that he has never experienced withdrawal symptoms. Pt endorses diarrhea that can be green in color. Pt endorses mid-abdominal pain and describes it as "someone is reaching inside of him and grabbing him." Pt endorses dizziness and cold sweats at this time. Lungs clear. Respirations even and unlabored. Pt endorses mild chest pain and describes it as "sharp." Bowel sounds auscultated in all four quadrants. Abdomen soft, non-distended and tender to touch on right side. No edema noted to bilateral lower extremities. Pt denies headache, SOB, fever, and cough. 20G IV placed to left antecubital. Labs drawn and sent. Pt's fiance at bedside. Will continue to monitor.

## 2020-01-23 NOTE — ED ADULT NURSE REASSESSMENT NOTE - NS ED NURSE REASSESS COMMENT FT1
Pt denies pain and discomfort at this time. Pt's vital signs stable and in no acute distress. Will continue to monitor.

## 2020-01-23 NOTE — ED PROVIDER NOTE - PROGRESS NOTE DETAILS
Seen by SW who assessed need for CPS referral - stated no need as children do not reside with pt.  Also, pt again declined Detox offer.

## 2020-01-23 NOTE — ED ADULT TRIAGE NOTE - CHIEF COMPLAINT QUOTE
Arrives ambulatory c/o lower abdominal pain with N/V/D since last night after drinking more than usual. Reports drinking several beers and several ale's daily, last night was last night. Pt noted to be midly tremulous on assessment, denies ever having withdrawal seizures.

## 2020-01-23 NOTE — ED PROVIDER NOTE - CLINICAL SUMMARY MEDICAL DECISION MAKING FREE TEXT BOX
[place] PGY2- 34 yoM, daily drink, binged last night, some blood in emesis today, epigastric pain, no withdrawal hx  tachy, no sig epigastric ttp, abd otherwise soft, not tremulous, normal neuro exam  eval for pancreatitis, dehydration, check Hgb, treat for alcoholic gastritis  provide SW resources and make sure child safety addressed given 2 young children at home  dispo pending w/u 34 yoM, daily EtOH user, binged last night, some blood in emesis today, epigastric pain, no withdrawal hx. tachy, mild epigastric ttp, abd otherwise soft, not tremulous, normal neuro exam. Eval for pancreatitis, dehydration, check Hgb, treat for possible alcoholic gastritis.   provide SW resources and make sure child safety addressed given 2 young children  dispo pending w/u

## 2020-01-23 NOTE — ED ADULT NURSE NOTE - INTERVENTIONS DEFINITIONS
[place] to call system/Call bell, personal items and telephone within reach/Stretcher in lowest position, wheels locked, appropriate side rails in place/Instruct patient to call for assistance/Monitor gait and stability/Physically safe environment: no spills, clutter or unnecessary equipment/Non-slip footwear when patient is off stretcher/Monitor for mental status changes and reorient to person, place, and time/Review medications for side effects contributing to fall risk/Reinforce activity limits and safety measures with patient and family

## 2020-01-23 NOTE — ED PROVIDER NOTE - PATIENT PORTAL LINK FT
You can access the FollowMyHealth Patient Portal offered by [facility] by registering at the following website: [email or web address]. By joining [employer]’s FollowMyHealth portal, you will also be able to view your health information using other applications (apps) compatible with our system.

## 2020-02-20 ENCOUNTER — EMERGENCY (EMERGENCY)
Facility: HOSPITAL | Age: 35
LOS: 1 days | Discharge: HOME | End: 2020-02-20
Attending: EMERGENCY MEDICINE | Admitting: EMERGENCY MEDICINE
Payer: MEDICAID

## 2020-02-20 VITALS
SYSTOLIC BLOOD PRESSURE: 129 MMHG | RESPIRATION RATE: 16 BRPM | TEMPERATURE: 98 F | OXYGEN SATURATION: 99 % | HEART RATE: 100 BPM | DIASTOLIC BLOOD PRESSURE: 79 MMHG

## 2020-02-20 VITALS
SYSTOLIC BLOOD PRESSURE: 101 MMHG | DIASTOLIC BLOOD PRESSURE: 62 MMHG | HEART RATE: 56 BPM | RESPIRATION RATE: 14 BRPM | OXYGEN SATURATION: 100 %

## 2020-02-20 PROBLEM — F10.10 ALCOHOL ABUSE, UNCOMPLICATED: Chronic | Status: ACTIVE | Noted: 2020-01-24

## 2020-02-20 PROCEDURE — 99284 EMERGENCY DEPT VISIT MOD MDM: CPT

## 2020-02-20 PROCEDURE — 73630 X-RAY EXAM OF FOOT: CPT | Mod: 26,LT

## 2020-02-20 PROCEDURE — 73700 CT LOWER EXTREMITY W/O DYE: CPT | Mod: 26,LT

## 2020-02-20 RX ORDER — OXYCODONE AND ACETAMINOPHEN 5; 325 MG/1; MG/1
1 TABLET ORAL ONCE
Refills: 0 | Status: DISCONTINUED | OUTPATIENT
Start: 2020-02-20 | End: 2020-02-20

## 2020-02-20 RX ORDER — IBUPROFEN 200 MG
600 TABLET ORAL ONCE
Refills: 0 | Status: COMPLETED | OUTPATIENT
Start: 2020-02-20 | End: 2020-02-20

## 2020-02-20 RX ORDER — OXYCODONE AND ACETAMINOPHEN 5; 325 MG/1; MG/1
2 TABLET ORAL ONCE
Refills: 0 | Status: DISCONTINUED | OUTPATIENT
Start: 2020-02-20 | End: 2020-02-20

## 2020-02-20 RX ADMIN — OXYCODONE AND ACETAMINOPHEN 1 TABLET(S): 5; 325 TABLET ORAL at 22:59

## 2020-02-20 RX ADMIN — Medication 600 MILLIGRAM(S): at 19:55

## 2020-02-20 RX ADMIN — OXYCODONE AND ACETAMINOPHEN 1 TABLET(S): 5; 325 TABLET ORAL at 19:54

## 2020-02-20 RX ADMIN — Medication 600 MILLIGRAM(S): at 17:51

## 2020-02-20 RX ADMIN — OXYCODONE AND ACETAMINOPHEN 2 TABLET(S): 5; 325 TABLET ORAL at 17:51

## 2020-02-20 RX ADMIN — OXYCODONE AND ACETAMINOPHEN 1 TABLET(S): 5; 325 TABLET ORAL at 20:30

## 2020-02-20 RX ADMIN — OXYCODONE AND ACETAMINOPHEN 2 TABLET(S): 5; 325 TABLET ORAL at 19:55
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
## 2020-02-20 NOTE — ED PROVIDER NOTE - PROGRESS NOTE DETAILS
ct of foot done, no need to wait for results as per podiatry. Pt aware of who and where to follow up with
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
## 2020-02-20 NOTE — ED PROVIDER NOTE - PATIENT PORTAL LINK FT
You can access the FollowMyHealth Patient Portal offered by [facility] by registering at the following website: [email or web address]. By joining [facility]’s FollowMyHealth portal, you will also be able to view your health information using other applications (apps) compatible with our system.
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
## 2020-02-20 NOTE — ED PROVIDER NOTE - NSFOLLOWUPINSTRUCTIONS_ED_ALL_ED_FT
Advance activity as tolerated.  Continue all previously prescribed medications as directed unless otherwise instructed.  Follow up with your primary care physician in 48-72 hours- bring copies of your results.  Return to the ER for worsening or persistent symptoms, and/or ANY NEW OR CONCERNING SYMPTOMS. If you have issues obtaining follow up, please call: [phone] to obtain a doctor or specialist who takes your insurance in your area.  You may call [phone] to make an appointment with the internal medicine clinic. Please follow up with Dr. Slim Dye at [phone] [place] [address], [phone] # [phone]    Advance activity as tolerated.  Continue all previously prescribed medications as directed unless otherwise instructed.  Follow up with your primary care physician in 48-72 hours- bring copies of your results.  Return to the ER for worsening or persistent symptoms, and/or ANY NEW OR CONCERNING SYMPTOMS. If you have issues obtaining follow up, please call: [phone] to obtain a doctor or specialist who takes your insurance in your area.  You may call [phone] to make an appointment with the internal medicine clinic.
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
## 2020-02-20 NOTE — ED PROVIDER NOTE - PHYSICAL EXAMINATION
[Const] well-appearing, resting comfortably, no acute distress  [HEENT] PERRL, EOMI, moist mucus membranes  [Neck] Supple, trachea midline  [CV] +S1/S2, no m/r/g appreciated  [Lungs] Clear to auscultations bilaterally, no adventitious lung sounds  [Abd] soft, non-tender, nondistended in all 4 quadrants  [MSK] 5/5 RUE/RLE, 5/5 LUE, LLE: moderate tenderness to palpation of left 3rd metatarsal and 2nd metatarsal base neurovascularly grossly intact  [Skin] warm, dry, well-perfused  [Neuro] A&Ox3, Cranial Nerves II-XII intact [HEENT] PERRL, EOMI, moist mucus membranes  [Neck] Supple, trachea midline  [CV] +S1/S2, no m/r/g appreciated  [Lungs] Clear to auscultations bilaterally, no adventitious lung sounds  [Abd] soft, non-tender, nondistended in all 4 quadrants  [MSK] 5/5 RUE/RLE, 5/5 LUE, LLE: moderate tenderness to palpation of left 3rd metatarsal and 2nd metatarsal base neurovascularly grossly intact, foot is very swollen. Cap refill < 3 sec  [Skin] warm, dry, well-perfused  [Neuro] A&Ox3, Cranial Nerves II-XII intact

## 2020-03-04 ENCOUNTER — OUTPATIENT (OUTPATIENT)
Dept: OUTPATIENT SERVICES | Facility: HOSPITAL | Age: 35
LOS: 1 days | End: 2020-03-04

## 2020-03-04 VITALS
HEIGHT: 66 IN | SYSTOLIC BLOOD PRESSURE: 90 MMHG | OXYGEN SATURATION: 98 % | HEART RATE: 81 BPM | WEIGHT: 126.99 LBS | TEMPERATURE: 98 F | DIASTOLIC BLOOD PRESSURE: 64 MMHG | RESPIRATION RATE: 16 BRPM

## 2020-03-04 DIAGNOSIS — S92.012A DISPLACED FRACTURE OF BODY OF LEFT CALCANEUS, INITIAL ENCOUNTER FOR CLOSED FRACTURE: ICD-10-CM

## 2020-03-04 DIAGNOSIS — M79.672 PAIN IN LEFT FOOT: ICD-10-CM

## 2020-03-04 LAB
ANION GAP SERPL CALC-SCNC: 16 MMO/L — HIGH (ref 7–14)
BUN SERPL-MCNC: 6 MG/DL — LOW (ref 7–23)
CALCIUM SERPL-MCNC: 9.1 MG/DL — SIGNIFICANT CHANGE UP (ref 8.4–10.5)
CHLORIDE SERPL-SCNC: 101 MMOL/L — SIGNIFICANT CHANGE UP (ref 98–107)
CO2 SERPL-SCNC: 25 MMOL/L — SIGNIFICANT CHANGE UP (ref 22–31)
CREAT SERPL-MCNC: 0.71 MG/DL — SIGNIFICANT CHANGE UP (ref 0.5–1.3)
GLUCOSE SERPL-MCNC: 82 MG/DL — SIGNIFICANT CHANGE UP (ref 70–99)
HCT VFR BLD CALC: 43.5 % — SIGNIFICANT CHANGE UP (ref 39–50)
HGB BLD-MCNC: 14.7 G/DL — SIGNIFICANT CHANGE UP (ref 13–17)
MCHC RBC-ENTMCNC: 31.7 PG — SIGNIFICANT CHANGE UP (ref 27–34)
MCHC RBC-ENTMCNC: 33.8 % — SIGNIFICANT CHANGE UP (ref 32–36)
MCV RBC AUTO: 94 FL — SIGNIFICANT CHANGE UP (ref 80–100)
NRBC # FLD: 0 K/UL — SIGNIFICANT CHANGE UP (ref 0–0)
PLATELET # BLD AUTO: 349 K/UL — SIGNIFICANT CHANGE UP (ref 150–400)
PMV BLD: 9.2 FL — SIGNIFICANT CHANGE UP (ref 7–13)
POTASSIUM SERPL-MCNC: 4.1 MMOL/L — SIGNIFICANT CHANGE UP (ref 3.5–5.3)
POTASSIUM SERPL-SCNC: 4.1 MMOL/L — SIGNIFICANT CHANGE UP (ref 3.5–5.3)
RBC # BLD: 4.63 M/UL — SIGNIFICANT CHANGE UP (ref 4.2–5.8)
RBC # FLD: 13.2 % — SIGNIFICANT CHANGE UP (ref 10.3–14.5)
SODIUM SERPL-SCNC: 142 MMOL/L — SIGNIFICANT CHANGE UP (ref 135–145)
WBC # BLD: 5.46 K/UL — SIGNIFICANT CHANGE UP (ref 3.8–10.5)
WBC # FLD AUTO: 5.46 K/UL — SIGNIFICANT CHANGE UP (ref 3.8–10.5)
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
## 2020-03-04 NOTE — H&P PST ADULT - OPHTHALMOLOGIC COMMENTS
bacterial infection to eye -since 12/2019 - states he had stopped his antibiotics since it improved but now it is worse again and he restarted the drop bacterial infection to eye -since 12/2019 - states he had stopped his antibiotics since it improved but now it is worse again and he restarted the drops

## 2020-03-04 NOTE — H&P PST ADULT - NSICDXPROBLEM_GEN_ALL_CORE_FT
PROBLEM DIAGNOSES  Problem: Pain in left foot  Assessment and Plan: Pt scheduled for surgery on 3/13/2020.  Pre-op instructions provided. Pt verbalized understanding.   Pepcid provided for GI prophylaxis.   Requested medical evaluation preop - for eye infection and recent fall with injury to head.   Pt with hx ETOH abuse - DT precautions.
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
## 2020-03-04 NOTE — H&P PST ADULT - NEGATIVE ENMT SYMPTOMS
no vertigo/no dysphagia/no tinnitus/no ear pain/no hearing difficulty/no sinus symptoms/no throat pain

## 2020-03-04 NOTE — H&P PST ADULT - NSICDXFAMILYHX_GEN_ALL_CORE_FT
FAMILY HISTORY:  Mother  Still living? Unknown  Family history of cirrhosis of liver, Age at diagnosis: Age Unknown

## 2020-03-04 NOTE — H&P PST ADULT - NSANTHOSAYNRD_GEN_A_CORE
No. SILVER screening performed.  STOP BANG Legend: 0-2 = LOW Risk; 3-4 = INTERMEDIATE Risk; 5-8 = HIGH Risk

## 2020-03-12 ENCOUNTER — TRANSCRIPTION ENCOUNTER (OUTPATIENT)
Age: 35
End: 2020-03-12

## 2020-03-13 ENCOUNTER — OUTPATIENT (OUTPATIENT)
Dept: OUTPATIENT SERVICES | Facility: HOSPITAL | Age: 35
LOS: 1 days | Discharge: HOME | End: 2020-03-13
Payer: MEDICAID

## 2020-03-13 VITALS
RESPIRATION RATE: 15 BRPM | OXYGEN SATURATION: 100 % | TEMPERATURE: 98 F | HEART RATE: 65 BPM | SYSTOLIC BLOOD PRESSURE: 100 MMHG | DIASTOLIC BLOOD PRESSURE: 63 MMHG

## 2020-03-13 VITALS
HEART RATE: 81 BPM | DIASTOLIC BLOOD PRESSURE: 63 MMHG | TEMPERATURE: 98 F | SYSTOLIC BLOOD PRESSURE: 106 MMHG | RESPIRATION RATE: 18 BRPM | OXYGEN SATURATION: 100 % | HEIGHT: 66 IN | WEIGHT: 126.99 LBS

## 2020-03-13 DIAGNOSIS — S92.012A DISPLACED FRACTURE OF BODY OF LEFT CALCANEUS, INITIAL ENCOUNTER FOR CLOSED FRACTURE: ICD-10-CM

## 2020-03-13 PROCEDURE — 73630 X-RAY EXAM OF FOOT: CPT | Mod: 26,LT

## 2020-03-13 RX ORDER — IBUPROFEN 200 MG
2 TABLET ORAL
Qty: 0 | Refills: 0 | DISCHARGE

## 2020-03-13 NOTE — ASU DISCHARGE PLAN (ADULT/PEDIATRIC) - FOR NEXT 24 HOURS DO NOT:
MFM visit today see note   Orders today as follows:  ORLIN/dopplers with MFM weekly.  First week of October Growth/doppler with MFM       Statement Selected

## 2020-03-13 NOTE — ASU DISCHARGE PLAN (ADULT/PEDIATRIC) - CARE PROVIDER_API CALL
Slim Dye (DPM)  Surgery  [address] [place] [address]  Phone: [phone]  Fax: [phone]  Follow Up Time:

## 2020-03-13 NOTE — ASU DISCHARGE PLAN (ADULT/PEDIATRIC) - CALL YOUR DOCTOR IF YOU HAVE ANY OF THE FOLLOWING:
Numbness, tingling, color or temperature change to extremity/Fever greater than (need to indicate Fahrenheit or Celsius)/Pain not relieved by Medications/Nausea and vomiting that does not stop/Bleeding that does not stop/Swelling that gets worse Fever greater than (need to indicate Fahrenheit or Celsius)/Pain not relieved by Medications/Nausea and vomiting that does not stop/Inability to tolerate liquids or foods/Numbness, tingling, color or temperature change to extremity/Increased irritability or sluggishness/Bleeding that does not stop/Swelling that gets worse

## 2020-03-13 NOTE — ASU DISCHARGE PLAN (ADULT/PEDIATRIC) - ASU DC SPECIAL INSTRUCTIONSFT
- keep dressing clean dry and intact   - non weight bear to the left foot in posterior splint   - elevate the left foot   - follow up with Dr. Dye next week

## 2020-06-18 ENCOUNTER — EMERGENCY (EMERGENCY)
Facility: HOSPITAL | Age: 35
LOS: 1 days | Discharge: HOME | End: 2020-06-18
Attending: STUDENT IN AN ORGANIZED HEALTH CARE EDUCATION/TRAINING PROGRAM | Admitting: EMERGENCY MEDICINE
Payer: MEDICAID

## 2020-06-18 VITALS
DIASTOLIC BLOOD PRESSURE: 72 MMHG | OXYGEN SATURATION: 100 % | RESPIRATION RATE: 18 BRPM | TEMPERATURE: 99 F | SYSTOLIC BLOOD PRESSURE: 126 MMHG | HEART RATE: 68 BPM

## 2020-06-18 LAB
ALBUMIN SERPL ELPH-MCNC: 4.7 G/DL — SIGNIFICANT CHANGE UP (ref 3.3–5)
ALP SERPL-CCNC: 86 U/L — SIGNIFICANT CHANGE UP (ref 40–120)
ALT FLD-CCNC: 14 U/L — SIGNIFICANT CHANGE UP (ref 4–41)
ANION GAP SERPL CALC-SCNC: 15 MMO/L — HIGH (ref 7–14)
AST SERPL-CCNC: 18 U/L — SIGNIFICANT CHANGE UP (ref 4–40)
BASOPHILS # BLD AUTO: 0.04 K/UL — SIGNIFICANT CHANGE UP (ref 0–0.2)
BASOPHILS NFR BLD AUTO: 0.6 % — SIGNIFICANT CHANGE UP (ref 0–2)
BILIRUB SERPL-MCNC: 0.6 MG/DL — SIGNIFICANT CHANGE UP (ref 0.2–1.2)
BUN SERPL-MCNC: 10 MG/DL — SIGNIFICANT CHANGE UP (ref 7–23)
CALCIUM SERPL-MCNC: 9.7 MG/DL — SIGNIFICANT CHANGE UP (ref 8.4–10.5)
CHLORIDE SERPL-SCNC: 96 MMOL/L — LOW (ref 98–107)
CO2 SERPL-SCNC: 26 MMOL/L — SIGNIFICANT CHANGE UP (ref 22–31)
CREAT SERPL-MCNC: 0.67 MG/DL — SIGNIFICANT CHANGE UP (ref 0.5–1.3)
EOSINOPHIL # BLD AUTO: 0.13 K/UL — SIGNIFICANT CHANGE UP (ref 0–0.5)
EOSINOPHIL NFR BLD AUTO: 1.8 % — SIGNIFICANT CHANGE UP (ref 0–6)
GLUCOSE SERPL-MCNC: 99 MG/DL — SIGNIFICANT CHANGE UP (ref 70–99)
HCT VFR BLD CALC: 42.4 % — SIGNIFICANT CHANGE UP (ref 39–50)
HGB BLD-MCNC: 14.2 G/DL — SIGNIFICANT CHANGE UP (ref 13–17)
HIV COMBO RESULT: SIGNIFICANT CHANGE UP
HIV1+2 AB SPEC QL: SIGNIFICANT CHANGE UP
IMM GRANULOCYTES NFR BLD AUTO: 0.4 % — SIGNIFICANT CHANGE UP (ref 0–1.5)
LYMPHOCYTES # BLD AUTO: 1.89 K/UL — SIGNIFICANT CHANGE UP (ref 1–3.3)
LYMPHOCYTES # BLD AUTO: 26.7 % — SIGNIFICANT CHANGE UP (ref 13–44)
MCHC RBC-ENTMCNC: 31 PG — SIGNIFICANT CHANGE UP (ref 27–34)
MCHC RBC-ENTMCNC: 33.5 % — SIGNIFICANT CHANGE UP (ref 32–36)
MCV RBC AUTO: 92.6 FL — SIGNIFICANT CHANGE UP (ref 80–100)
MONOCYTES # BLD AUTO: 0.93 K/UL — HIGH (ref 0–0.9)
MONOCYTES NFR BLD AUTO: 13.2 % — SIGNIFICANT CHANGE UP (ref 2–14)
NEUTROPHILS # BLD AUTO: 4.05 K/UL — SIGNIFICANT CHANGE UP (ref 1.8–7.4)
NEUTROPHILS NFR BLD AUTO: 57.3 % — SIGNIFICANT CHANGE UP (ref 43–77)
NRBC # FLD: 0 K/UL — SIGNIFICANT CHANGE UP (ref 0–0)
PLATELET # BLD AUTO: 206 K/UL — SIGNIFICANT CHANGE UP (ref 150–400)
PMV BLD: 9.6 FL — SIGNIFICANT CHANGE UP (ref 7–13)
POTASSIUM SERPL-MCNC: 4.3 MMOL/L — SIGNIFICANT CHANGE UP (ref 3.5–5.3)
POTASSIUM SERPL-SCNC: 4.3 MMOL/L — SIGNIFICANT CHANGE UP (ref 3.5–5.3)
PROT SERPL-MCNC: 7.5 G/DL — SIGNIFICANT CHANGE UP (ref 6–8.3)
RBC # BLD: 4.58 M/UL — SIGNIFICANT CHANGE UP (ref 4.2–5.8)
RBC # FLD: 13 % — SIGNIFICANT CHANGE UP (ref 10.3–14.5)
SODIUM SERPL-SCNC: 137 MMOL/L — SIGNIFICANT CHANGE UP (ref 135–145)
WBC # BLD: 7.07 K/UL — SIGNIFICANT CHANGE UP (ref 3.8–10.5)
WBC # FLD AUTO: 7.07 K/UL — SIGNIFICANT CHANGE UP (ref 3.8–10.5)

## 2020-06-18 PROCEDURE — 99283 EMERGENCY DEPT VISIT LOW MDM: CPT

## 2020-06-18 RX ORDER — LIDOCAINE 4 G/100G
10 CREAM TOPICAL ONCE
Refills: 0 | Status: COMPLETED | OUTPATIENT
Start: 2020-06-18 | End: 2020-06-18

## 2020-06-18 RX ORDER — DIPHENHYDRAMINE HCL 50 MG
25 CAPSULE ORAL ONCE
Refills: 0 | Status: COMPLETED | OUTPATIENT
Start: 2020-06-18 | End: 2020-06-18

## 2020-06-18 RX ORDER — VALACYCLOVIR 500 MG/1
1000 TABLET, FILM COATED ORAL ONCE
Refills: 0 | Status: COMPLETED | OUTPATIENT
Start: 2020-06-18 | End: 2020-06-18

## 2020-06-18 RX ORDER — VALACYCLOVIR 500 MG/1
1 TABLET, FILM COATED ORAL
Qty: 21 | Refills: 0
Start: 2020-06-18 | End: 2020-06-24

## 2020-06-18 RX ORDER — LIDOCAINE 4 G/100G
10 CREAM TOPICAL
Qty: 2 | Refills: 0
Start: 2020-06-18 | End: 2020-06-22

## 2020-06-18 RX ADMIN — VALACYCLOVIR 1000 MILLIGRAM(S): 500 TABLET, FILM COATED ORAL at 19:54

## 2020-06-18 RX ADMIN — Medication 30 MILLILITER(S): at 17:47

## 2020-06-18 RX ADMIN — Medication 25 MILLIGRAM(S): at 17:47

## 2020-06-18 RX ADMIN — LIDOCAINE 10 MILLILITER(S): 4 CREAM TOPICAL at 19:54

## 2020-06-18 RX ADMIN — Medication 50 MILLIGRAM(S): at 18:50

## 2020-06-18 RX ADMIN — VALACYCLOVIR 1000 MILLIGRAM(S): 500 TABLET, FILM COATED ORAL at 17:47

## 2020-06-18 RX ADMIN — LIDOCAINE 10 MILLILITER(S): 4 CREAM TOPICAL at 17:47

## 2020-06-18 NOTE — ED ADULT NURSE NOTE - CHIEF COMPLAINT QUOTE
Arrives from home for open wounds/sores in his mouth worsening over several days. Was admitted here recently for same issues states he was told it was a "blood infection". Went to a hospital in [place] about a week ago, given ABX without relief. Pt appears uncomfortable in triage. Denies other PMH

## 2020-06-18 NOTE — ED PROVIDER NOTE - OBJECTIVE STATEMENT
35 Y/O M with HX of erythema multiforme presents to ER with oral ulcerations. Started to experience symptoms about 3 weeks ago. Went to ER in [place] which prescribed amoxicillin TID for 10 days. Reports some improvement but ulcerations continued to flare up. Went to pharmacy which provided additional abx but symptoms have worsened. Reports difficulty tolerating PO oral intake but able to swallow fluids. Describes burning sensation with contact. Admitted in August of 2019 for similar complaints. Derm/Rheum evaluated patient during hospital admission. 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 Pt states flare ups are controlled with valtrex. Denies HIV, syphillis, GC, fever, NVD or chills

## 2020-06-18 NOTE — ED PROVIDER NOTE - PATIENT PORTAL LINK FT
You can access the FollowMyHealth Patient Portal offered by [facility] by registering at the following website: [email or web address]. By joining [facility]’s FollowMyHealth portal, you will also be able to view your health information using other applications (apps) compatible with our system.

## 2020-06-18 NOTE — ED PROVIDER NOTE - ATTENDING CONTRIBUTION TO CARE
35 yo M past medical history of oral rash (per Derm note from visit EM minor poss induced by HSV) presents for approx 3 weeks of oral rash. started on upper lip and inside mouth. had blood blister that opened and has intermittently been bleeding x 1 week. patient reports increased intraoral lesions occurring since. patient denies fever chills. states tolerating liquids, minimally tolerating soft solids. exam as above, mild acne on back that patient reports as present since teenage years. concerning painful rash. presumed HSV-induced EM in past x2. patient with painful lesions involving oral mucosa. EM, ?SJS. will given valtrex, symptom relief, will consider systemic steroids after derm consult.

## 2020-06-18 NOTE — ED PROVIDER NOTE - PHYSICAL EXAMINATION
Vital signs reviewed.   CONSTITUTIONAL: Well-appearing; well-nourished; in no apparent distress. Non-toxic appearing.   HEAD: Normocephalic, atraumatic.  EYES: PERRL, EOM intact, conjunctiva and no sclera injection noted  ENT: ulceration noted on upper lower lip. Coalescing ulcerations/plaques throughout buccal mucosa, soft palate and tongue. Patent airway  NECK/LYMPH: Supple; non-tender  CARD: Normal S1, S2  RESP: Normal chest excursion with respiration; breath sounds clear and equal bilaterally; no wheezes, rhonchi, or rales.  ABD/GI: soft, non-distended; non-tender;   EXT/MS: moves all extremities; distal pulses are normal, no pedal edema.  SKIN: Normal for age and race; warm; dry; good turgor; no apparent lesions or exudate noted.  NEURO: Awake, alert, oriented x 3, no gross deficits, CN II-XII grossly intact, no motor or sensory deficit noted.  PSYCH: Normal mood; appropriate affect.

## 2020-06-18 NOTE — ED ADULT TRIAGE NOTE - CHIEF COMPLAINT QUOTE
Arrives from home for open wounds/sores in his mouth worsening over several days. Was admitted here recently for same issues states he was told it was a "blood infection". Went to a hospital in [place] about a week ago, given ABX without relief. Pt appears uncomfortable in triage. Denies other PMH

## 2020-06-18 NOTE — ED ADULT NURSE NOTE - OBJECTIVE STATEMENT
Pt reports mouth sores x 2 weeks. Pt has a hx of same and reports being hospitalized in the past for a "blood infection". Pt has current sores in her mouth and lips. Pt was prescribed amoxicillin outpatient recently with no help. Pt reports mouth sores x 2 weeks. Pt has a hx of same and reports being hospitalized in the past for a "blood infection". Pt has current sores in his mouth and lips. Pt was prescribed amoxicillin outpatient recently with no help.

## 2020-06-18 NOTE — ED PROVIDER NOTE - CLINICAL SUMMARY MEDICAL DECISION MAKING FREE TEXT BOX
35 Y/O M with HX of erythema multiforme presents to ER with oral ulcerations. Will culture ulcerations. Ensure patient can tolerate PO oral intake. Continue to monitor during ER 33 Y/O M with HX of erythema multiforme presents to ER with oral ulcerations. Will culture ulcerations. Ensure patient can tolerate PO oral intake. Continue to monitor during ER course.    Discussed case with dermatology resident. Recommends culture of ulcerations. Pt provided course of steroids and medications for symptom relief during ER course. Pt tolerating PO oral fluids and solids without difficulty. Will begin on outpt course of prednisone and valtrex. Derm referral provided. Precautions given. Will return to ER for worsening symptoms

## 2020-06-18 NOTE — CHART NOTE - NSCHARTNOTEFT_GEN_A_CORE
Dermatology TeleDerm Quick Chart Note    33 Y/O M with HX of erythema multiforme presents to ER with oral ulcerations. Started to experience symptoms about 3 weeks ago. Went to ER in [place] which prescribed amoxicillin TID for 10 days. Reports some improvement but ulcerations continued to flare up. Reports difficulty tolerating PO oral intake but able to swallow fluids. Describes burning sensation with contact.     Dermatology consulted for evaluation of oral ulcerations. Per chart review, patient seen by our service in August 2019 for similar oral ulcerations but also with cutaneous involvement. Biopsy for H+E/DIF consistent with diagnosis of erythema multiforme presumed 2/2 HSV. Treated with pred/valtrex course. Presented to ED multiple times for similar oral ulcerations without cutaneous involvement and discharged home on pred taper and valtrex. Per ED PA, there is no cutaneous involvement today.     PE: Photos sent from ED reviewed  Numerous punched out erosions with scalloped borders and heme crusting on the mucosal and vermillion lips    #Orolabial Herpes due HSV  Lesions clinically consistent with diagnosis of orolabial herpes 2/2 HSV-1  No cutaneous involvement today. Unlikely to be consistent with diagnosis of erythema multiforme  Recommend 3 week prednisone taper, will defer to ED team  Recommend ED team send lesional HSV PCR to identify if virus present. Please swab multiple ulcers to increase yield and send STAT. If results consistent with HSV-1, would start valtrex in addition to prednisone taper.   Recommend outpatient follow up with dermatology:    [facility] at [place]  [address]  [address]  [place] [address]  [phone]    Please provide office information in the discharge instructions and inform patient to schedule follow up appointment.    Raffy Alegre MD  PGY 2 Resident  Department of Dermatology  [phone]    Discussed with on-call attending Dr. Bourgeois and staffed remotely

## 2020-06-18 NOTE — ED PROVIDER NOTE - NSFOLLOWUPINSTRUCTIONS_ED_ALL_ED_FT
1) Please follow up with    [facility] at [place]  [address]  [address]  [place], [place] [address] [phone]    2) Return to the ED immediately for new or worsening symptoms including shortness of breath, difficulty breathing or inability night  3) Please continue to take any home medications as prescribed  4) Your test results from your ED visit were discussed with you prior to discharge  5) You were provided with a copy of your test results

## 2020-06-18 NOTE — ED PROVIDER NOTE - PROGRESS NOTE DETAILS
Discussed case with Derm resident Raffy Alegre. Pt gave consent for photos. Will send via secure email channel.    BREE Daniels

## 2020-06-19 LAB
HSV+VZV DNA SPEC QL NAA+PROBE: SIGNIFICANT CHANGE UP
SPECIMEN SOURCE: SIGNIFICANT CHANGE UP

## 2020-07-26 ENCOUNTER — EMERGENCY (EMERGENCY)
Facility: HOSPITAL | Age: 35
LOS: 1 days | Discharge: HOME | End: 2020-07-26
Attending: EMERGENCY MEDICINE | Admitting: EMERGENCY MEDICINE
Payer: COMMERCIAL

## 2020-07-26 VITALS
TEMPERATURE: 98 F | DIASTOLIC BLOOD PRESSURE: 67 MMHG | HEART RATE: 91 BPM | SYSTOLIC BLOOD PRESSURE: 122 MMHG | RESPIRATION RATE: 18 BRPM | WEIGHT: 130.07 LBS | HEIGHT: 66 IN | OXYGEN SATURATION: 96 %

## 2020-07-26 DIAGNOSIS — K12.0 RECURRENT ORAL APHTHAE: ICD-10-CM

## 2020-07-26 DIAGNOSIS — K13.70 UNSPECIFIED LESIONS OF ORAL MUCOSA: ICD-10-CM

## 2020-07-26 PROCEDURE — 99283 EMERGENCY DEPT VISIT LOW MDM: CPT

## 2020-07-26 RX ORDER — LIDOCAINE 4 G/100G
10 CREAM TOPICAL ONCE
Refills: 0 | Status: COMPLETED | OUTPATIENT
Start: 2020-07-26 | End: 2020-07-26

## 2020-07-26 RX ORDER — VALACYCLOVIR 500 MG/1
1 TABLET, FILM COATED ORAL
Qty: 14 | Refills: 0
Start: 2020-07-26 | End: 2020-09-08

## 2020-07-26 RX ORDER — VALACYCLOVIR 500 MG/1
1000 TABLET, FILM COATED ORAL ONCE
Refills: 0 | Status: COMPLETED | OUTPATIENT
Start: 2020-07-26 | End: 2020-07-26

## 2020-07-26 RX ORDER — LIDOCAINE 4 G/100G
10 CREAM TOPICAL
Qty: 280 | Refills: 0
Start: 2020-07-26 | End: 2020-08-01

## 2020-07-26 RX ORDER — IBUPROFEN 200 MG
600 TABLET ORAL ONCE
Refills: 0 | Status: COMPLETED | OUTPATIENT
Start: 2020-07-26 | End: 2020-07-26

## 2020-07-26 RX ORDER — LIDOCAINE 4 G/100G
10 CREAM TOPICAL
Qty: 2 | Refills: 0
Start: 2020-07-26 | End: 2020-09-06

## 2020-07-26 RX ORDER — LIDOCAINE 4 G/100G
10 CREAM TOPICAL
Qty: 2 | Refills: 0
Start: 2020-07-26 | End: 2020-07-30

## 2020-07-26 RX ORDER — VALACYCLOVIR 500 MG/1
1 TABLET, FILM COATED ORAL
Qty: 14 | Refills: 0
Start: 2020-07-26 | End: 2020-08-01

## 2020-07-26 RX ORDER — VALACYCLOVIR 500 MG/1
1 TABLET, FILM COATED ORAL
Qty: 14 | Refills: 0
Start: 2020-07-26 | End: 2020-09-20

## 2020-07-26 RX ADMIN — Medication 600 MILLIGRAM(S): at 19:07

## 2020-07-26 RX ADMIN — LIDOCAINE 10 MILLILITER(S): 4 CREAM TOPICAL at 19:08

## 2020-07-26 RX ADMIN — VALACYCLOVIR 1000 MILLIGRAM(S): 500 TABLET, FILM COATED ORAL at 19:08

## 2020-07-26 NOTE — ED PROVIDER NOTE - PATIENT PORTAL LINK FT
You can access the FollowMyHealth Patient Portal offered by [facility] by registering at the following website: [email or web address]. By joining [facility]’s FollowMyHealth portal, you will also be able to view your health information using other applications (apps) compatible with our system.

## 2020-07-26 NOTE — ED ADULT NURSE NOTE - NSIMPLEMENTINTERV_GEN_ALL_ED
Implemented All Universal Safety Interventions:  [place] to call system. Call bell, personal items and telephone within reach. Instruct patient to call for assistance. Room bathroom lighting operational. Non-slip footwear when patient is off stretcher. Physically safe environment: no spills, clutter or unnecessary equipment. Stretcher in lowest position, wheels locked, appropriate side rails in place.

## 2020-07-26 NOTE — ED ADULT NURSE NOTE - CHIEF COMPLAINT QUOTE
"I have open sores in my mouth and I have pain and they started when I stopped my medication". Pt was taking acyclovir and prednison.

## 2020-07-26 NOTE — ED PROVIDER NOTE - ATTENDING CONTRIBUTION TO CARE
Attending Statement: I have personally performed a face to face diagnostic evaluation on this patient. I have reviewed the ACP note and agree with the history, exam and plan of care, except as noted.     Attending Contribution to Care:  33 yo M with no pmh c/o mouth sores x 3 days. Pt states he had similar sores about 1 month ago. pt was nikolai at [facility], labs, HIV and HSV were negative. Pt was given prednisone and valtrex but never completed the full course. Sore resolved but now came back. Admit to pain. Denies fever, chills, sore throat. Pt is sexually active with 1 partner, performed oral sex a few days ago but partner does not have sores. VSS. +ulcerations to lower lip and b/l buccal mucosa. Likely aphthous ulcers. Pain control and Outpt follow up advised. Stable for DC.

## 2020-07-26 NOTE — ED PROVIDER NOTE - OBJECTIVE STATEMENT
33 yo M with no pmh c/o sore in mouth x 3 days. Pt states he had similar sores about 1 month ago. pt was nikolai at [facility], labs, HIV and HSV were negative. Pt was given prednisone and valtrex but never completed the full course. Sore resolved but now came back. Admit to pain. Denies fever, chills, sore throat. Pt is sexually active with 1 partner, performed oral sex a few days ago but partner does not have sores.

## 2020-07-26 NOTE — ED PROVIDER NOTE - PHYSICAL EXAMINATION
CONSTITUTIONAL: Well-appearing; well-nourished; in no apparent distress.   HEAD: Normocephalic; atraumatic.   EYES: PERRL; EOM intact; conjunctiva and sclera clear  ENT: +ulcerations to lower lip and b/l buccal mucosa   NECK: Supple; non-tender;   CARDIOVASCULAR: Normal S1, S2; no murmurs, rubs, or gallops. Regular rate and rhythm.   RESPIRATORY: Breathing easily; breath sounds clear and equal bilaterally; no wheezes, rhonchi, or rales.  MSK: FROM at all extremities, normal tone   EXT: No cyanosis or edema; N/V intact  SKIN: Normal for age and race; warm; dry; good turgor; no apparent lesions or rash.

## 2020-07-26 NOTE — ED PROVIDER NOTE - CLINICAL SUMMARY MEDICAL DECISION MAKING FREE TEXT BOX
33 yo M with no pmh c/o sore in mouth x 3 days. Pt states he had similar sores about 1 month ago. pt was nikolai at [facility], labs, HIV and HSV were negative. Pt was given prednisone and valtrex but never completed the full course. Sore resolved but now came back. Admit to pain. Denies fever, chills, sore throat. Pt is sexually active with 1 partner, performed oral sex a few days ago but partner does not have sores. VSS. +ulcerations to lower lip and b/l buccal mucosa. Will treat pain and prescribe valtrex. Will refer to ID

## 2020-07-26 NOTE — ED PROVIDER NOTE - NSFOLLOWUPINSTRUCTIONS_ED_ALL_ED_FT
Canker Sores    WHAT YOU NEED TO KNOW:    Canker sores are small ulcers that develop inside your mouth. Ulcers are open sores that may be shallow or deep. You may have one or more sores at a time, and they may grow in clusters.     DISCHARGE INSTRUCTIONS:    Return to the emergency department if:     You cannot eat or drink because of your mouth pain.        Contact your healthcare provider if:     Your canker sores are not gone after 3 to 4 weeks.      Your pain does not go away after you take medicines.      Your sores are getting worse or you are getting more sores, even after treatment.       You have questions or concerns about your condition or care.    Medicines:You may need any of the following:     Pain medicine may be given as a cream, gel, or mouthwash.      Steroid medicine may be given to decrease redness, swelling, and pain.      Take your medicine as directed. Contact your healthcare provider if you think your medicine is not helping or if you have side effects. Tell him or her if you are allergic to any medicine. Keep a list of the medicines, vitamins, and herbs you take. Include the amounts, and when and why you take them. Bring the list or the pill bottles to follow-up visits. Carry your medicine list with you in case of an emergency.    Follow up with your healthcare provider as directed: Write down your questions so you remember to ask them during your visits.     Eat soft, plain foods until your canker sores heal: Examples include yogurt, eggs, and creamy soups. You may need to change some foods you usually eat. Do not have crunchy, dry, or salty foods, such as dry toast, popcorn, or chips. These can cause pain. Do not have foods or drinks that contain citric acid, such as grapefruit, orange juice, vu, and limes. These may make your pain worse or cause more sores to form.     Mouth care: Gently brush your teeth and tongue every day. Use a soft toothbrush. If you have dentures, clean them every day. If your braces or dentures do not feel comfortable, have a dentist check them to see that they fit well.      Oral Herpes Simplex Virus Infections    WHAT YOU NEED TO KNOW:    Oral herpes simplex virus (HSV) infections cause sores to form on the mouth, lips, or gums. HSV has 2 types. Oral HSV infections are most often caused by HSV type 1. HSV type 2 normally affects the genital area, but may also occur in the mouth. After you are infected, the virus hides in your nerves and may return. An HSV infection that comes back is also known as a cold sore.     DISCHARGE INSTRUCTIONS:    Medicines:     Antiviral medicine: This decreases symptoms and shortens the amount of time blisters are present. You may also need to take it daily to prevent blisters. The medicine may be given as a liquid, pill, or ointment. Use as directed.       Numbing medicine: This decreases mouth pain. It is usually given as a mouth rinse. Use it before you eat or drink, or as directed.     Follow up with your healthcare provider as directed: Write down your questions so you remember to ask them during your visits.     Self-care:     Eat soft, bland foods: Avoid salty, acidic, spicy, sharp-edged, and hard foods. Eat healthy foods to help healing.      Drink liquids: Cool liquids may help soothe your mouth and numb the pain. Avoid citrus or carbonated drinks, such as orange or grapefruit juice, lemonade, or soda. These liquids may cause your mouth to hurt more. A straw may help if you have blisters on the lips or tongue.       Use ice: Ice helps decrease swelling and pain. Drink cold water or suck on ice to help decrease pain on your tongue or inside your mouth. Use an ice pack, or put crushed ice in a plastic bag on your lip. Cover it with a towel and place it on your lip for 15 to 20 minutes every hour or as directed.    Prevent the spread of the herpes simplex virus:     Do not have close contact with people until the blisters heal. This includes touching, kissing, and oral sex.       Do not get close to babies or to people who are sick while you have cold sores.      Do not share eating utensils, towels, lip balm, or makeup with another person.      Do not touch the blisters or pick at the scabs. Do not touch other body parts, especially your eyes or genitals without washing your hands first. Wash your hands often.    Contact your healthcare provider if:     You have a fever.       Your symptoms become worse or do not improve a week after you start treatment.      You have difficulty eating or drinking because of the pain in your mouth.       You get a headache, are nauseated, or vomit.      Your eyes feel irritated, or you feel like you have something in your eye.      Your skin becomes itchy, swollen, or develops a rash after you take your medicine.      You have questions or concerns about your condition or care.    Return to the emergency department if:     You get a fever, feel achy, or see pus instead of clear fluid in the sores.      You get sores on your eyes.      You have abdominal pain, a severe headache, or confusion.       You get new symptoms, or old symptoms return after you have been treated.

## 2020-07-26 NOTE — ED PROVIDER NOTE - CARE PROVIDER_API CALL
Ana Ricardo  INTERNAL MEDICINE  [address] [place] [address]  Phone: [phone]  Fax: [phone]  Follow Up Time:

## 2020-09-02 ENCOUNTER — EMERGENCY (EMERGENCY)
Facility: HOSPITAL | Age: 35
LOS: 1 days | Discharge: HOME | End: 2020-09-02
Attending: STUDENT IN AN ORGANIZED HEALTH CARE EDUCATION/TRAINING PROGRAM | Admitting: STUDENT IN AN ORGANIZED HEALTH CARE EDUCATION/TRAINING PROGRAM
Payer: MEDICAID

## 2020-09-02 VITALS
RESPIRATION RATE: 16 BRPM | SYSTOLIC BLOOD PRESSURE: 106 MMHG | HEART RATE: 81 BPM | TEMPERATURE: 97 F | DIASTOLIC BLOOD PRESSURE: 76 MMHG | OXYGEN SATURATION: 100 %

## 2020-09-02 PROCEDURE — 99283 EMERGENCY DEPT VISIT LOW MDM: CPT

## 2020-09-02 RX ORDER — DIPHENHYDRAMINE HCL 50 MG
25 CAPSULE ORAL ONCE
Refills: 0 | Status: COMPLETED | OUTPATIENT
Start: 2020-09-02 | End: 2020-09-02

## 2020-09-02 RX ORDER — LIDOCAINE 4 G/100G
5 CREAM TOPICAL ONCE
Refills: 0 | Status: COMPLETED | OUTPATIENT
Start: 2020-09-02 | End: 2020-09-02

## 2020-09-02 RX ADMIN — Medication 25 MILLIGRAM(S): at 23:10

## 2020-09-02 RX ADMIN — LIDOCAINE 5 MILLILITER(S): 4 CREAM TOPICAL at 23:10

## 2020-09-02 RX ADMIN — Medication 30 MILLILITER(S): at 23:10

## 2020-09-02 NOTE — ED ADULT NURSE NOTE - NS ED NOTE  TALK SOMEONE YN
"Anesthesia Transfer of Care Note    Patient: Alfredina Claudette Parks    Procedure(s) Performed: Procedure(s) (LRB):  ULTRASOUND-ENDOSCOPIC-UPPER (N/A)    Patient location: PACU    Anesthesia Type: general    Transport from OR: Transported from OR on 2-3 L/min O2 by NC with adequate spontaneous ventilation    Post pain: adequate analgesia    Post assessment: no apparent anesthetic complications and tolerated procedure well    Post vital signs: stable    Level of consciousness: sedated    Nausea/Vomiting: no nausea/vomiting    Complications: none    Transfer of care protocol was followed      Last vitals:   Visit Vitals  /85   Pulse 79   Temp 36.7 °C (98.1 °F)   Resp 18   Ht 4' 10" (1.473 m)   Wt 76.2 kg (168 lb)   SpO2 97%   Breastfeeding? No   BMI 35.11 kg/m²     " No

## 2020-09-02 NOTE — ED PROVIDER NOTE - PMH
Chief Complaint   Patient presents with   • UTI     back pain vaginal itching no burning sensation went to the urgent care gave antibiotic for 5 days finished meds    • Cough     sob after taking meds for about a week        HPI:  Soila is a [place] 55 year old female with a past med hx of sickness induced asthma who presents for continued vaginal itching, back pain and to establish care at this location. Pt previously saw a PCP at [facility] in [place] ([place], [place] Patient was seen at Urgent care 6/11/19 for suprapubic tenderness, urinary frequency and mild cold symptoms. She was diagnosed as cystitis with hematuria. Patient was started on Nitrufantoin 5 day treatment. States the antibiotic gave her bone aches and blurry vision but she continued taking it until course finished. Pt states she is still having increased frequency and now she is having a bit of chest tightness and coughing. Pt states she has, \"sickness induced asthma\" for which she has been prescribed albuterol in the past and it has now ran out.  Pt believes she had a fever yesterday (subjective), her bones felt achy and eyes were on fire. Pt took two tylenol which helped a lot. Pt states when she got home she felt better and had energy enough to \"run a marathon.\"  Patient has been drinking \"massive amounts of cranberry juice and water.\"   Patient was taking AZO since it was recommended at the urgent care but stopped taking it because coworker told her it causes cancer.  She states she feels itching and some burning if she does not urinate the instant she gets the sensation.   Pt states this is the first time she has had an UTI.  No recent intercourse.  States she never usually gets sick.  Patient is very concerned that she may have cancer since her mother was diagnosed with ovarian (negative for genetic marker) and colon cancer, passed away at the age of 55.   Father had throat cancer passed away at age of 78 - chain smoker.    Patient  admits to worrying a lot and initially thinking negative about things. Gives herself a lot of anxiety.    Patient Active Problem List   Diagnosis   • Anxiety     Past Surgical History:   Procedure Laterality Date   • Tubal ligation       Current Outpatient Medications   Medication Sig Dispense Refill   • albuterol 108 (90 Base) MCG/ACT inhaler Inhale 2 puffs into the lungs every 4 hours as needed for Other (asthma). 1 Inhaler 3     No current facility-administered medications for this visit.      Social History     Socioeconomic History   • Marital status: Single   • Number of children: 4   Tobacco Use   • Smoking status: Former Smoker   • Smokeless tobacco: Never Used   • Tobacco comment: quit smoking 35 years ago, was smokinh one pack per week    Substance and Sexual Activity   • Alcohol use: Yes     Comment: On holidays, wine   • Drug use: Never   • Sexual activity: Not Currently   Lifestyle   • Physical activity:     Days per week: 3 days     Minutes per session: 40 min   • Stress: Not on file   Social History Narrative    Son 39y/o(), Daughter 37y/o, 35y/o, 19y/o.      Family History   Problem Relation Age of Onset   • Cancer Mother    • Cancer, Colon Mother    • Cancer, Ovarian Mother    • Cancer, Throat Father        Health Maintenance Summary     Depression Screening (Yearly)  Overdue since 7/13/1973    DTaP/Tdap/Td Vaccine (1 - Tdap)  Overdue since 7/13/1980    Cervical Cancer Screening HPV CO-Testing (Every 5 Years)  Overdue since 7/13/1991    Colorectal Cancer Screening-Colonoscopy (Every 10 Years)  Overdue since 7/13/2011    Shingles Vaccine (1 of 2)  Overdue since 7/13/2011    Hepatitis C Screening (Once)  Overdue since 7/13/2012    Breast Cancer Screening (Every 2 Years)  Overdue since 7/13/2016    Influenza Vaccine (1)  Next due on 9/1/2019    Pneumococcal Vaccine 0-64   Aged Out        Patient's medication, allergy, past medical hx, past surgical hx, family hx, and social hx have been reviewed  Alcohol abuse    H/O erythema multiforme    Rash and reconciled during today's visit.     Review of Systems   Constitutional: Positive for fever (Pt just felt hot, no temp recorded). Negative for activity change, appetite change and chills.   HENT: Negative for congestion, sinus pressure, sinus pain, sore throat and trouble swallowing.    Eyes: Negative for discharge and visual disturbance.   Respiratory: Positive for chest tightness and shortness of breath. Negative for wheezing.    Cardiovascular: Negative for chest pain and palpitations.   Gastrointestinal: Negative for abdominal pain, constipation, diarrhea, nausea and vomiting.   Endocrine: Negative for cold intolerance and heat intolerance.   Genitourinary: Positive for dysuria, frequency, menstrual problem (Pt believes she is having menopausal symptoms) and urgency. Negative for difficulty urinating and vaginal discharge.   Musculoskeletal: Positive for back pain.   Skin: Negative for rash.   Allergic/Immunologic: Negative for environmental allergies.   Neurological: Negative for dizziness, light-headedness and headaches.   Hematological: Negative for adenopathy.   Psychiatric/Behavioral: The patient is nervous/anxious.      OBJECTIVE:  Visit Vitals  /86 (BP Location: [facility], Patient Position: Sitting)   Pulse 70   Temp 98 °F (36.7 °C) (Oral)   Resp 18   Ht 5' 2\" (1.575 m)   Wt 61.4 kg (135 lb 4 oz)   SpO2 98%   BMI 24.74 kg/m²     Urinalysis:  Component Value Ref Range & Units Status Collected Lab   POCT Color Yellow   Preliminary 07/03/2019 11:24 AM Unknown   POCT Appearance Clear   Preliminary 07/03/2019 11:24 AM Unknown   POCT Glucose Urine Negative  Negative Preliminary 07/03/2019 11:24 AM Unknown   POCT Bilirubin Negative  Negative Preliminary 07/03/2019 11:24 AM Unknown   POCT Ketones Negative  Negative Preliminary 07/03/2019 11:24 AM Unknown   POCT Specific Gravity 1.015  1.005 - 1.03 Preliminary 07/03/2019 11:24 AM Unknown   POCT Occult Blood Trace  Negative Preliminary 07/03/2019 11:24 AM  Unknown   POCT pH 7.0  5 - 7 Preliminary 07/03/2019 11:24 AM Unknown   POCT Protein Negative  Negative Preliminary 07/03/2019 11:24 AM Unknown   POCT Urobilinogen 0.2  0 - 1 mg/dL Preliminary 07/03/2019 11:24 AM Unknown   Urine Nitrite Negative  Negative Preliminary 07/03/2019 11:24 AM Unknown   WBC (Leukocyte) Esterase POC Small  Negative Preliminary 07/03/2019 11:24 AM        Physical Exam   Constitutional: She appears well-developed and well-nourished. No distress.   HENT:   Head: Normocephalic and atraumatic.   Right Ear: External ear normal.   Left Ear: External ear normal.   Nose: Nose normal.   Eyes: Conjunctivae and EOM are normal. Right eye exhibits no discharge. Left eye exhibits no discharge.   Neck: Normal range of motion. Neck supple.   Cardiovascular: Normal rate and normal heart sounds.   No murmur heard.  Pulmonary/Chest: Effort normal and breath sounds normal. No respiratory distress. She has no wheezes. She exhibits no tenderness.   Abdominal: Soft. Bowel sounds are normal. There is no tenderness.   Musculoskeletal: Normal range of motion. She exhibits no edema or tenderness (no tenderness of back on palpation).   Neurological: She is alert. She exhibits normal muscle tone. Coordination normal.   Skin: Skin is warm and dry. No rash noted. She is not diaphoretic.   Psychiatric: Her behavior is normal.   Patient appeared very anxious at the beginning of the visit and then became tearful because she is afraid that she might have cancer like her mother due to her recent UTI symptoms. Patient very concerned because she never usually gets sick. Is also concerned because she is a single mother and has the added stress of her kids. After reassurance patient seems extremely relieved. Pleasant throughout the entire visit despite being anxious and tearful.   Nursing note and vitals reviewed.    ASSESSMENT/PLAN:  Acute UTI   -Patient recently treated for cystitis with Marcobid. Pt still symptomatic.   -UA  order in office and culture sent based on findings    - POCT URINALYSIS DIPSTICK     -Pt still had small leukocytes, may still have an UTI hence culture sent    - URINE CULTURE   -Also discussed possibility of yeast infection and why it can develop after abx treatment   -Discussed symptomatic treatment by drinking plenty of water. Pt to switch from drinking large amounts of cranberry juice to taking cranberry pills instead.   -Pt to use AZO OTC as needed and directed    -Do not exceed 6 tabs   -Advised stopping coffee, caffeinated drinks, chocolate until symptoms resolve   -Practice good sanitary hygeine   -Holding off on another course of abx, further recommendations after culture received.    Mild intermittent asthma in adult without complication   -Patient states she has intermittent \"sickness induced asthma\"    -Well controlled with albuterol   -Patient needing refill of medication   -Use as needed  - albuterol 108 (90 Base) MCG/ACT inhaler; Inhale 2 puffs into the lungs every 4 hours as needed for Other (asthma).  Dispense: 1 Inhaler; Refill: 3    Anxiety   -Patient states she tends to have anxiety because she thinks of the worse happening especially with a strong family history of cancer   -Discussed symptoms in detail and addressed all of patient's concerns    -Patient relieved and no longer anxious    Please return if symptoms worsen  Please return for annual physical    Greater than 50% of the visit was spent counseling regarding above issues; total time spent 45 minutes.    Glenys Gill DO  07/03/19

## 2020-09-02 NOTE — ED PROVIDER NOTE - ATTENDING CONTRIBUTION TO CARE
35yo M with etoh abuse, smoker with 35yo M with etoh abuse, smoker with recurrent aphthous ulcers. 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   on exam multiple aphthous ulcers worst on lower lip. will give topical/dental steroid traimcinolone, magic mouthwash. pt states he needs valtrex (though nevers finishes course when prescribed) exaplined to pt that ulcers not hsv however demands that it works

## 2020-09-02 NOTE — ED PROVIDER NOTE - PATIENT PORTAL LINK FT
You can access the FollowMyHealth Patient Portal offered by [facility] by registering at the following website: [email or web address]. By joining [employer]’s FollowMyHealth portal, you will also be able to view your health information using other applications (apps) compatible with our system.

## 2020-09-02 NOTE — ED PROVIDER NOTE - CLINICAL SUMMARY MEDICAL DECISION MAKING FREE TEXT BOX
patient with hx of etoh abuse, and oral sores presenting for acute exacerbation. patient will b treated with analgesic stat, rx of valtrex and topical steroid will be prescribed. patient advised on etoh cessation.

## 2020-09-02 NOTE — ED ADULT NURSE NOTE - NSIMPLEMENTINTERV_GEN_ALL_ED
Implemented All Universal Safety Interventions:  [place] to call system. Call bell, personal items and telephone within reach. Instruct patient to call for assistance. Room bathroom lighting operational. Non-slip footwear when patient is off stretcher. Physically safe environment: no spills, clutter or unnecessary equipment. Stretcher in lowest position, wheels locked, appropriate side rails in place.

## 2020-09-02 NOTE — ED PROVIDER NOTE - ENMT, MLM
+ ulceration to the mucosal membrane to lower lip and buccal region.  +ulceration to lateral aspect of tongue. Airway patent, Nasal mucosa clear. Mouth with normal mucosa. Throat has no vesicles, no oropharyngeal exudates and uvula is midline.

## 2020-09-02 NOTE — ED ADULT TRIAGE NOTE - CHIEF COMPLAINT QUOTE
Pt arrives to ED c/o mouth sores,  Pt reports having this in the past.  Pt had a biopsy 1 year ago.  Pt reports condition improves with Prednisone and Valtrex.  Pt went to a new PMD but could not get prescription and doctor did not run any tests.  Pt was referred to a dermatologist who directed patient to the ED because the sores are in side the mouth and not on the exterior.  Pt reports pain to mouth that worsens with food or drink.

## 2020-09-02 NOTE — ED PROVIDER NOTE - OBJECTIVE STATEMENT
33 yo M with hx oral sore in mouth x 2 years, with multiple ED visits presents for an acute exacerbation of symptoms. Pt states he had similar sores about 1 month ago. Previous labs, HIV and HSV were negative. He states that's symptoms improve with valtrex and prednisone. He believes his excessive etoh use and smoking is a trigger for his out break.. Denies fever, chills, sore throat.

## 2020-09-02 NOTE — ED PROVIDER NOTE - NSFOLLOWUPINSTRUCTIONS_ED_ALL_ED_FT
1. TAKE ALL MEDICATIONS AS DIRECTED.    2. FOR PAIN OR FEVER YOU CAN TAKE IBUPROFEN (MOTRIN, ADVIL) OR ACETAMINOPHEN (TYLENOL) AS NEEDED, AS DIRECTED ON PACKAGING.  3. FOLLOW UP WITH YOUR PRIMARY DOCTOR WITHIN 5 DAYS AS DIRECTED.  4. IF YOU HAD LABS OR IMAGING DONE, YOU WERE GIVEN COPIES OF ALL LABS AND/OR IMAGING RESULTS FROM YOUR ER VISIT--PLEASE TAKE THEM WITH YOU TO YOUR FOLLOW UP APPOINTMENTS.  5. IF NEEDED, CALL PATIENT ACCESS SERVICES AT [phone] TO FIND A PRIMARY CARE PHYSICIAN.  OR CALL [phone] TO MAKE AN APPOINTMENT WITH THE CLINIC.  6. RETURN TO THE ER FOR ANY WORSENING SYMPTOMS OR CONCERNS.

## 2020-09-02 NOTE — ED ADULT NURSE NOTE - OBJECTIVE STATEMENT
patient aaox3. ambulatory w/o assist. came in with mouth sores x2 weeks. patient reports he was diagnosed last year with a blood infection and has had this before. was given 1 week prescription of prednisone and valtrex. patient has red sores, unopened to inner lower lip and inside cheeks. unable to tolerate po well. denies nausea, vomiting, fever, chills, weakness. no allergies.

## 2020-09-14 ENCOUNTER — EMERGENCY (EMERGENCY)
Facility: HOSPITAL | Age: 35
LOS: 1 days | Discharge: HOME | End: 2020-09-14
Attending: EMERGENCY MEDICINE | Admitting: EMERGENCY MEDICINE
Payer: MEDICAID

## 2020-09-14 VITALS
TEMPERATURE: 98 F | OXYGEN SATURATION: 99 % | RESPIRATION RATE: 16 BRPM | DIASTOLIC BLOOD PRESSURE: 77 MMHG | SYSTOLIC BLOOD PRESSURE: 110 MMHG | HEART RATE: 89 BPM | HEIGHT: 66 IN

## 2020-09-14 PROCEDURE — 99283 EMERGENCY DEPT VISIT LOW MDM: CPT

## 2020-09-14 RX ORDER — VALACYCLOVIR 500 MG/1
1000 TABLET, FILM COATED ORAL ONCE
Refills: 0 | Status: COMPLETED | OUTPATIENT
Start: 2020-09-14 | End: 2020-09-14

## 2020-09-14 RX ADMIN — VALACYCLOVIR 1000 MILLIGRAM(S): 500 TABLET, FILM COATED ORAL at 20:40

## 2020-09-14 RX ADMIN — Medication 50 MILLIGRAM(S): at 20:40

## 2020-09-14 NOTE — ED ADULT NURSE NOTE - OBJECTIVE STATEMENT
patient aaox3. ambulatory w/o assist. came in with mouth sores. has been here before for the same issue. patient has sores to mouth which are painful, no bloody or purulent drainage. given meds as ordered. Will continue to monitor

## 2020-09-14 NOTE — ED ADULT TRIAGE NOTE - CHIEF COMPLAINT QUOTE
pt c/o wounds to the inside of the mouth. Pt states he was seen here for the same problem approx 2 weeks ago and was given medication that helped it but now the medication ran out and the wounds are back. PMH: ETOH abuse (last drink 5pm)

## 2020-09-24 ENCOUNTER — EMERGENCY (EMERGENCY)
Facility: HOSPITAL | Age: 35
LOS: 1 days | Discharge: HOME | End: 2020-09-24
Attending: HOSPITALIST | Admitting: HOSPITALIST
Payer: MEDICAID

## 2020-09-24 VITALS
HEART RATE: 95 BPM | HEIGHT: 66 IN | DIASTOLIC BLOOD PRESSURE: 53 MMHG | RESPIRATION RATE: 16 BRPM | OXYGEN SATURATION: 99 % | SYSTOLIC BLOOD PRESSURE: 102 MMHG | TEMPERATURE: 98 F

## 2020-09-24 PROCEDURE — 99283 EMERGENCY DEPT VISIT LOW MDM: CPT

## 2020-09-24 RX ORDER — LIDOCAINE 4 G/100G
10 CREAM TOPICAL
Qty: 280 | Refills: 0
Start: 2020-09-24 | End: 2020-09-30

## 2020-09-24 RX ORDER — VALACYCLOVIR 500 MG/1
1 TABLET, FILM COATED ORAL
Qty: 14 | Refills: 0
Start: 2020-09-24 | End: 2020-09-30

## 2020-09-24 NOTE — ED PROVIDER NOTE - PATIENT PORTAL LINK FT
You can access the FollowMyHealth Patient Portal offered by [facility] by registering at the following website: [email or web address]. By joining [employer]’s FollowMyHealth portal, you will also be able to view your health information using other applications (apps) compatible with our system.

## 2020-09-24 NOTE — ED ADULT TRIAGE NOTE - CHIEF COMPLAINT QUOTE
Pt. c/o lesions in mouth. States he was seen on 9/16 for same thing. Prescribed medication and has had some improvement. States he ran out of medication and needs more. Denies fevers.

## 2020-09-24 NOTE — ED PROVIDER NOTE - ENMT, MLM
Airway patent, Nasal mucosa clear. ulcers to tongue, inner lip, inner right cheek. Throat has no vesicles, no oropharyngeal exudates and uvula is midline.

## 2020-09-24 NOTE — ED PROVIDER NOTE - CLINICAL SUMMARY MEDICAL DECISION MAKING FREE TEXT BOX
34M with recurrent oral ulcers. will redose valtrex, steroids and viscous lidocaine. rheum f/u as outpt.

## 2020-09-24 NOTE — ED PROVIDER NOTE - OBJECTIVE STATEMENT
34M with hx of oral ulvers and eye redness p/w worsening symptoms. pt states ulcers have been present for the past week. ulcers are painful and limiting his oral intake secondary to pain. can drink fluids. has been seen in the ED about 10 times for the same complaint and was told he has an infection in his blood. no fevers, visual changes, gi symptoms, ulcers anywhere else on the body, penile discharge. tested twice for hiv and hsv 1/2, results all negative. evaluated by Ophthalmology and Derm, who suspected herpes infection but w/u negative.

## 2020-11-24 ENCOUNTER — EMERGENCY (EMERGENCY)
Facility: HOSPITAL | Age: 35
LOS: 1 days | Discharge: HOME | End: 2020-11-24
Attending: STUDENT IN AN ORGANIZED HEALTH CARE EDUCATION/TRAINING PROGRAM | Admitting: STUDENT IN AN ORGANIZED HEALTH CARE EDUCATION/TRAINING PROGRAM
Payer: MEDICAID

## 2020-11-24 VITALS
TEMPERATURE: 98 F | DIASTOLIC BLOOD PRESSURE: 80 MMHG | OXYGEN SATURATION: 98 % | HEIGHT: 66 IN | HEART RATE: 100 BPM | SYSTOLIC BLOOD PRESSURE: 117 MMHG | RESPIRATION RATE: 16 BRPM

## 2020-11-24 VITALS
SYSTOLIC BLOOD PRESSURE: 117 MMHG | HEART RATE: 84 BPM | RESPIRATION RATE: 98 BRPM | TEMPERATURE: 98 F | DIASTOLIC BLOOD PRESSURE: 67 MMHG

## 2020-11-24 LAB
ALBUMIN SERPL ELPH-MCNC: 4.9 G/DL — SIGNIFICANT CHANGE UP (ref 3.3–5)
ALP SERPL-CCNC: 79 U/L — SIGNIFICANT CHANGE UP (ref 40–120)
ALT FLD-CCNC: 48 U/L — HIGH (ref 4–41)
ANION GAP SERPL CALC-SCNC: 13 MMO/L — SIGNIFICANT CHANGE UP (ref 7–14)
APPEARANCE UR: CLEAR — SIGNIFICANT CHANGE UP
AST SERPL-CCNC: 58 U/L — HIGH (ref 4–40)
BACTERIA # UR AUTO: NEGATIVE — SIGNIFICANT CHANGE UP
BASOPHILS # BLD AUTO: 0.07 K/UL — SIGNIFICANT CHANGE UP (ref 0–0.2)
BASOPHILS NFR BLD AUTO: 1.1 % — SIGNIFICANT CHANGE UP (ref 0–2)
BILIRUB SERPL-MCNC: 0.4 MG/DL — SIGNIFICANT CHANGE UP (ref 0.2–1.2)
BILIRUB UR-MCNC: NEGATIVE — SIGNIFICANT CHANGE UP
BLOOD UR QL VISUAL: NEGATIVE — SIGNIFICANT CHANGE UP
BUN SERPL-MCNC: 10 MG/DL — SIGNIFICANT CHANGE UP (ref 7–23)
CALCIUM SERPL-MCNC: 10 MG/DL — SIGNIFICANT CHANGE UP (ref 8.4–10.5)
CHLORIDE SERPL-SCNC: 100 MMOL/L — SIGNIFICANT CHANGE UP (ref 98–107)
CO2 SERPL-SCNC: 25 MMOL/L — SIGNIFICANT CHANGE UP (ref 22–31)
COLOR SPEC: YELLOW — SIGNIFICANT CHANGE UP
CREAT SERPL-MCNC: 0.71 MG/DL — SIGNIFICANT CHANGE UP (ref 0.5–1.3)
CRP SERPL-MCNC: 6 MG/L — HIGH
EOSINOPHIL # BLD AUTO: 0.15 K/UL — SIGNIFICANT CHANGE UP (ref 0–0.5)
EOSINOPHIL NFR BLD AUTO: 2.3 % — SIGNIFICANT CHANGE UP (ref 0–6)
GLUCOSE SERPL-MCNC: 94 MG/DL — SIGNIFICANT CHANGE UP (ref 70–99)
GLUCOSE UR-MCNC: NEGATIVE — SIGNIFICANT CHANGE UP
HCT VFR BLD CALC: 43.2 % — SIGNIFICANT CHANGE UP (ref 39–50)
HGB BLD-MCNC: 14.3 G/DL — SIGNIFICANT CHANGE UP (ref 13–17)
HYALINE CASTS # UR AUTO: NEGATIVE — SIGNIFICANT CHANGE UP
IMM GRANULOCYTES NFR BLD AUTO: 0.2 % — SIGNIFICANT CHANGE UP (ref 0–1.5)
KETONES UR-MCNC: SIGNIFICANT CHANGE UP
LEUKOCYTE ESTERASE UR-ACNC: NEGATIVE — SIGNIFICANT CHANGE UP
LYMPHOCYTES # BLD AUTO: 1.3 K/UL — SIGNIFICANT CHANGE UP (ref 1–3.3)
LYMPHOCYTES # BLD AUTO: 19.5 % — SIGNIFICANT CHANGE UP (ref 13–44)
MCHC RBC-ENTMCNC: 31.7 PG — SIGNIFICANT CHANGE UP (ref 27–34)
MCHC RBC-ENTMCNC: 33.1 % — SIGNIFICANT CHANGE UP (ref 32–36)
MCV RBC AUTO: 95.8 FL — SIGNIFICANT CHANGE UP (ref 80–100)
MONOCYTES # BLD AUTO: 0.66 K/UL — SIGNIFICANT CHANGE UP (ref 0–0.9)
MONOCYTES NFR BLD AUTO: 9.9 % — SIGNIFICANT CHANGE UP (ref 2–14)
NEUTROPHILS # BLD AUTO: 4.47 K/UL — SIGNIFICANT CHANGE UP (ref 1.8–7.4)
NEUTROPHILS NFR BLD AUTO: 67 % — SIGNIFICANT CHANGE UP (ref 43–77)
NITRITE UR-MCNC: NEGATIVE — SIGNIFICANT CHANGE UP
NRBC # FLD: 0 K/UL — SIGNIFICANT CHANGE UP (ref 0–0)
PH UR: 7 — SIGNIFICANT CHANGE UP (ref 5–8)
PLATELET # BLD AUTO: 195 K/UL — SIGNIFICANT CHANGE UP (ref 150–400)
PMV BLD: 9.3 FL — SIGNIFICANT CHANGE UP (ref 7–13)
POTASSIUM SERPL-MCNC: 3.9 MMOL/L — SIGNIFICANT CHANGE UP (ref 3.5–5.3)
POTASSIUM SERPL-SCNC: 3.9 MMOL/L — SIGNIFICANT CHANGE UP (ref 3.5–5.3)
PROT SERPL-MCNC: 7.8 G/DL — SIGNIFICANT CHANGE UP (ref 6–8.3)
PROT UR-MCNC: 30 — SIGNIFICANT CHANGE UP
RBC # BLD: 4.51 M/UL — SIGNIFICANT CHANGE UP (ref 4.2–5.8)
RBC # FLD: 12.6 % — SIGNIFICANT CHANGE UP (ref 10.3–14.5)
RBC CASTS # UR COMP ASSIST: SIGNIFICANT CHANGE UP (ref 0–?)
SODIUM SERPL-SCNC: 138 MMOL/L — SIGNIFICANT CHANGE UP (ref 135–145)
SP GR SPEC: 1.03 — SIGNIFICANT CHANGE UP (ref 1–1.04)
SQUAMOUS # UR AUTO: SIGNIFICANT CHANGE UP
UROBILINOGEN FLD QL: HIGH
WBC # BLD: 6.66 K/UL — SIGNIFICANT CHANGE UP (ref 3.8–10.5)
WBC # FLD AUTO: 6.66 K/UL — SIGNIFICANT CHANGE UP (ref 3.8–10.5)
WBC UR QL: SIGNIFICANT CHANGE UP (ref 0–?)

## 2020-11-24 PROCEDURE — 99283 EMERGENCY DEPT VISIT LOW MDM: CPT

## 2020-11-24 RX ORDER — VALACYCLOVIR 500 MG/1
1 TABLET, FILM COATED ORAL
Qty: 14 | Refills: 0
Start: 2020-11-24 | End: 2020-11-30

## 2020-11-24 RX ORDER — VALACYCLOVIR 500 MG/1
1000 TABLET, FILM COATED ORAL ONCE
Refills: 0 | Status: COMPLETED | OUTPATIENT
Start: 2020-11-24 | End: 2020-11-24

## 2020-11-24 RX ADMIN — Medication 50 MILLIGRAM(S): at 19:56

## 2020-11-24 RX ADMIN — VALACYCLOVIR 1000 MILLIGRAM(S): 500 TABLET, FILM COATED ORAL at 22:05

## 2020-11-24 NOTE — ED PROVIDER NOTE - OBJECTIVE STATEMENT
34 yo M w/ hx of intermittent episodes of flares including oral ulcerations and left eye redness since 2019, presenting for recurrence of ulcers and associated pain making it difficult for him to eat. He denies other symptoms, and states that he needs valacyclovir and prednisone. Pt has been seen in the ER several times for these symptoms, and states that they resolve after treatment with these medications. He has never followed up outpatient for any evaluation or treatment, and says that he cant because he works too much. Pt has been admitted for this in the past w/ extensive work-up, and review of chart shows it is suspected these symptoms are 2/2 to erythema multiform 2/2 to HSV after skin biopsy by dermatology. Although the left eye is erythematous during this visit, he denies changes in his vision or pain in his left eye at this time. He says he has had blurred vision in the past with past flares. Denies rash (but has had rash in UE's in the past). He denies fevers, chills, cough, congestion, sob, cp, abd pain, diarrhea, hematochezia, melena, or changes in urination. Pt has tested negative for HSV and HIV previously. Ophthalmology has evaluated him in the past and do not follow him regularly.

## 2020-11-24 NOTE — ED PROVIDER NOTE - PATIENT PORTAL LINK FT
You can access the FollowMyHealth Patient Portal offered by [facility] by registering at the following website: [email or web address]. By joining [employer]’s FollowMyHealth portal, you will also be able to view your health information using other applications (apps) compatible with our system.

## 2020-11-24 NOTE — ED ADULT TRIAGE NOTE - CHIEF COMPLAINT QUOTE
pt arrives c/o mouth sores and lip swelling x 2 days. States he has had these symptoms before, treated with Valtrex and Prednisone. +Pain. Denies fevers, bleeding or drainage from area.

## 2020-11-24 NOTE — ED PROVIDER NOTE - NSPTACCESSSVCSAPPTDETAILS_ED_ALL_ED_FT
Please call pt to arrange follow up with rheumatology within 1 to 2 weeks for oral ulcerations. He has been seen in the ER for this several times and failed to follow up for management outpatient.

## 2020-11-24 NOTE — ED PROVIDER NOTE - ATTENDING CONTRIBUTION TO CARE
Neo ZAVALETA: I agree with the above provided history and exam and addend/modify it as follows.    35M w/out known pmh - p/w oral ulcers x 2 days, similar to prior episodes for which he was seen here multiple times in June, Sept this year. Patient was seen by derm and dx w/ HSV, went to ED and got several courses of valtrex and prednisone w/ improvement of sx, never followed up outpatient as he was directed. Patient reports difficulty eating and swallowing 2/2 pain, but is able to. No fever, n/v/d/c, chest / abd pain, cough, sob, dizziness, dysuria/hematuria. No recent travel, medication change, illness, or hospitalization.     On exam oral mucosa with several ulcerated lesions. no genital lesions, no other skin findings.     Plan to check screening labs, give prednisone for possible autoimmune etiology; pt is stable, will likely dc w/ close outpt rheum f/u and d/w discharge center to help arrange follow up    I Primo Larson MD performed a history and physical exam of the patient and discussed their management with the resident and /or advanced care provider. I reviewed the resident and /or ACP's note and agree with the documented findings and plan of care. My medical decision making and observations are found above.

## 2020-11-24 NOTE — ED PROVIDER NOTE - NSFOLLOWUPCLINICS_GEN_ALL_ED_FT
[facility] Rheumatology  Rheumatology  [address] [address]  Phone: [phone]  Fax:   Follow Up Time:

## 2020-11-24 NOTE — ED PROVIDER NOTE - CLINICAL SUMMARY MEDICAL DECISION MAKING FREE TEXT BOX
34 yo M w/ hx of questionable autoimmune vs infectious process, presenting similarly to previously w/ oral ulcerations and difficulty eating due to pain. Previously admitted for work up and treated multiple times in ED w/ course of valacyclovir and prednisone. Denies fevers, vision changes, N/V, changes in BM's and changes in urination. Pt has not followed up outpatient for this. VSS. Exam as above. Concern for autoimmune process. Will check for renal impairment or signs of electrolyte abnormalities and infection. Will also check inflammatory markers. Will reassess.

## 2020-11-24 NOTE — ED PROVIDER NOTE - PHYSICAL EXAMINATION
Gen: A&Ox4.   HEENT: Oral ulcerations over lips and buccal mucosa. Left eye erythematous. Pupils PERRL and EOMI w/o pain. No obvious bleeding. Atraumatic. No pharyngeal erythema or exudates. Mucous membranes moist, no scleral icterus.   Eyes: Pupils ERRL. EOMI. No lid or lacrimal lesions. No conjunctival injection or erythema. Sclera white w/o injection. Retina demonstrating sharp disc margins. No fundal papilledema or hemorrhage. Visual fields x 4 intact. Visual acuity: OD X/X OS X/X. Slit lamp exam: No cells or flare on examination of anterior chamber. Lens clear. Flourescein staining w/o significant uptake. IOP: 20.    CV: Regular rate regular rhythm. No M/R/G. No significant lower extremity edema. Distal pulses intact. Capillary refill < 2 seconds.  Resp: Normal effort. CTAB, no rales, rhonchi, or wheezes.  GI: abdomen non tender to palpation, soft and non-distended. No masses appreciated. + BS.  MSK: No open wounds and no ecchymosis appreciated  Neuro: Following commands. Moving extremities spontaneously  Psych: Appropriate mood, cooperative Gen: A&Ox4.   HEENT: Oral ulcerations over lips and buccal mucosa. Left eye erythematous w/o discharge. Pupils PERRL and EOMI w/o pain. Visual fields x 4 intact. No obvious bleeding. Atraumatic. No pharyngeal erythema or exudates. Mucous membranes moist, no scleral icterus.   CV: Regular rate regular rhythm. No M/R/G. No significant lower extremity edema. Distal pulses intact. Capillary refill < 2 seconds.  Resp: Normal effort. CTAB, no rales, rhonchi, or wheezes.  GI: abdomen non tender to palpation, soft and non-distended. No masses appreciated. + BS.  MSK: No significant rashes. No open wounds and no ecchymosis appreciated  Neuro: Following commands. Moving extremities spontaneously  Psych: Appropriate mood, cooperative

## 2020-11-24 NOTE — ED ADULT NURSE NOTE - OBJECTIVE STATEMENT
Received pt to intake bed 10B, A+Ox3, ambulatory. C/O lacerations in his mouth, pain. Respirations even and unlabored, normal work of breathing, no accessory muscle use, speaking in full clear uninterrupted sentences. ABD is soft, non tender, non distended with normal active bowel sounds. Pt denies any chest pain, trouble swallowing, SOB, headache, dizziness, N/V/D, fever, chills.  20G to LAC, Labs sent, Medicated as per MD, will continue to monitor.

## 2020-11-24 NOTE — ED PROVIDER NOTE - NSFOLLOWUPINSTRUCTIONS_ED_ALL_ED_FT
1. You presented to the emergency department for:  oral ulcerations    2. Your evaluation in the emergency department included a physician evaluation and testing consisting of:   Lab work. These tests did not show any concerning findings, and the results are available in your paper work.     3. It is recommended that you follow-up with the recommended physician/clinic within1 to 2 weeks as discussed for a repeat evaluation, and potentially further testing and treatment.     4. Please continue taking your regular medications as prescribed. For pain you may take acetaminophen (Tylenol) as needed, and as directed on the packaging. We are also providing you with a prescription for valacyclovir and prednisone for you to  at your pharmacy.    5. PLEASE RETURN TO THE EMERGENCY DEPARTMENT IMMEDIATELY IF you have any fevers, development of diffuse rash, increasing pain not responding to treatment, uncontrollable nausea and vomiting, lightheadedness, inability to tolerate eating and drinking, severe increase in symptoms or pain, or an other new symptoms that concern you.

## 2020-11-25 LAB
CULTURE RESULTS: SIGNIFICANT CHANGE UP
SPECIMEN SOURCE: SIGNIFICANT CHANGE UP

## 2020-12-28 PROBLEM — Z00.00 ENCOUNTER FOR PREVENTIVE HEALTH EXAMINATION: Status: ACTIVE | Noted: 2020-12-28

## 2020-12-31 ENCOUNTER — APPOINTMENT (OUTPATIENT)
Dept: RHEUMATOLOGY | Facility: CLINIC | Age: 35
End: 2020-12-31
Payer: MEDICAID

## 2020-12-31 VITALS
RESPIRATION RATE: 16 BRPM | DIASTOLIC BLOOD PRESSURE: 80 MMHG | WEIGHT: 230 LBS | OXYGEN SATURATION: 96 % | TEMPERATURE: 97.7 F | HEART RATE: 106 BPM | BODY MASS INDEX: 38.32 KG/M2 | SYSTOLIC BLOOD PRESSURE: 110 MMHG | HEIGHT: 65 IN

## 2020-12-31 DIAGNOSIS — Z78.9 OTHER SPECIFIED HEALTH STATUS: ICD-10-CM

## 2020-12-31 DIAGNOSIS — K12.1 OTHER FORMS OF STOMATITIS: ICD-10-CM

## 2020-12-31 DIAGNOSIS — Z72.89 OTHER PROBLEMS RELATED TO LIFESTYLE: ICD-10-CM

## 2020-12-31 DIAGNOSIS — H57.89 OTHER SPECIFIED DISORDERS OF EYE AND ADNEXA: ICD-10-CM

## 2020-12-31 PROCEDURE — 99072 ADDL SUPL MATRL&STAF TM PHE: CPT

## 2020-12-31 PROCEDURE — 99205 OFFICE O/P NEW HI 60 MIN: CPT

## 2020-12-31 RX ORDER — LIDOCAINE HYDROCHLORIDE 40 MG/ML
4 SOLUTION TOPICAL
Qty: 1 | Refills: 0 | Status: ACTIVE | COMMUNITY
Start: 2020-12-31 | End: 1900-01-01

## 2020-12-31 NOTE — PHYSICAL EXAM
[General Appearance - Well Nourished] : well nourished [General Appearance - Well Developed] : well developed [Auscultation Breath Sounds / Voice Sounds] : lungs were clear to auscultation bilaterally [Heart Sounds] : normal S1 and S2 [Heart Sounds Gallop] : no gallops [Murmurs] : no murmurs [Heart Sounds Pericardial Friction Rub] : no pericardial rub [Abdomen Soft] : soft [Abdomen Tenderness] : non-tender [] : no hepato-splenomegaly [Cervical Lymph Nodes Enlarged Posterior Bilaterally] : posterior cervical [Cervical Lymph Nodes Enlarged Anterior Bilaterally] : anterior cervical [Supraclavicular Lymph Nodes Enlarged Bilaterally] : supraclavicular [Axillary Lymph Nodes Enlarged Bilaterally] : axillary [Musculoskeletal - Swelling] : no joint swelling seen [Oriented To Time, Place, And Person] : oriented to person, place, and time [FreeTextEntry1] : post inflammatory hyperpigmentation of forearms

## 2020-12-31 NOTE — HISTORY OF PRESENT ILLNESS
[FreeTextEntry1] : 36 y/o M here to establish care\par \par Pt reports on and off mouth blisters for the past year. \par \par Pt recently visited the [facility] ED for mouth blisters, and was given valtrex, and prednisone, which was working, but only cleared 80%. \par \par Has had hospitalizations in past for this, but work up done so far was negative. \par He is given valtrex and prednisone, which makes the blisters go away. \par Pt also developed dark lesions in past on forearms w mouth lesions. 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 \par Also gets redness of L eye everything this happens, w no pain, visual changes. \par \par Blisters are painful, and makes it hard for patient to eat. He is only able to eat smash potatoes. \par \par No fevers, chills, h/a, rashes, hair loss, oral ulcers, swollen glands, dry mouth, dry eyes, CP, SOB, cough, abdominal pain, GERD, n/v/d, blood in stool or urine, focal weakness, sensory loss,  Raynaud's, joint pain, swelling, weight loss.\par \par

## 2020-12-31 NOTE — ASSESSMENT
[FreeTextEntry1] : 36 y/o M w recurrent mouth ulcers\par =recurrent oral ulcers\par =L eye redness w flares of ulcers\par =HSV PCR negative of ora ulcers\par =past episodes of arm lesions; bx showing lichen dermatitis, lymphohistiocytic inflammatory infiltrate, weak C3 deposition in basement membrane, otherwise IF negative \par \par Pt w recurrent episodes of oral blisters, and L eye redness. While patient improves, pt is treated w BOTH valtrex and steroids, so difficult to attribute which agent improved patient's symptoms. Also, would be unusual to have L eye involvement w HSV1/HSV2 oral lesions? Furthermore, HSV PCR was negative in 2019. \par \par Suspect that there might be another underlying inflammatory/autoimmune issue given presentation. Bechets (oral ulcers, L ocular involvement-usually b/l in Bechets) vs mucous membrane pemphigoid (does not explain occular involvement) vs other autoimmune dz (SLE?-less likely). \par \par Will perform serologic work up w [id number]. Need to refer to optho ASAP to evaluate underlying process. Also refer to dermatology for further work up. \par \par Plan\par Labs w serologic work\par Optho referral\par Derm referral \par RTO depending on results

## 2020-12-31 NOTE — REVIEW OF SYSTEMS
[Feeling Poorly] : feeling poorly [Feeling Tired] : feeling tired [Recent Weight Loss (___ Lbs)] : recent [unfilled] ~Ulb weight loss [As Noted in HPI] : as noted in HPI [Fever] : no fever [Chills] : no chills [Recent Weight Gain (___ Lbs)] : no recent weight gain [Heart Rate Is Slow] : the heart rate was not slow [Heart Rate Is Fast] : the heart rate was not fast [Chest Pain] : no chest pain [Palpitations] : no palpitations [Leg Claudication] : no intermittent leg claudication [Lower Ext Edema] : no extremity edema [Shortness Of Breath] : no shortness of breath [Wheezing] : no wheezing [Cough] : no cough [SOB on Exertion] : no shortness of breath during exertion [Orthopnea] : no orthopnea [PND] : no PND [Abdominal Pain] : no abdominal pain [Vomiting] : no vomiting [Constipation] : no constipation [Diarrhea] : no diarrhea [Heartburn] : no heartburn [Dysuria] : no dysuria [Incontinence] : no incontinence [Hesitancy] : no urinary hesitancy [Nocturia] : no nocturia [Genital Lesion] : no genital lesions [Testicular Pain] : no testicular pain [Arthralgias] : no arthralgias [Joint Pain] : no joint pain [Joint Swelling] : no joint swelling [Joint Stiffness] : no joint stiffness [Limb Pain] : no limb pain [Limb Swelling] : no limb swelling [Confused] : no confusion [Convulsions] : no convulsions [Dizziness] : no dizziness [Fainting] : no fainting [Limb Weakness] : no limb weakness [Difficulty Walking] : no difficulty walking [Proptosis] : no proptosis [Hot Flashes] : no hot flashes [Muscle Weakness] : no muscle weakness [Erectile Dysfunction] : no erectile dysfunction [Deepening Of The Voice] : no deepening of the voice [Feelings Of Weakness] : no feelings of weakness [Easy Bleeding] : no tendency for easy bleeding [Easy Bruising] : no tendency for easy bruising [Swollen Glands] : no swollen glands [Swollen Glands In The Neck] : no swollen glands in the neck

## 2020-12-31 NOTE — DATA REVIEWED
[FreeTextEntry1] : Labs reviewed from 11/20\par ESR 7, CRP 6\par Mild transaminitis AST/ALT 58/48 respectively\par \par Labs reviewed from 2019\par Hep B, C negative\par CMV, EBV, HIV negative\par HSV PCR from mouth swab negative \par MALA negative\par \par R arm bx 2019 reviewed\par 1.  Right arm, punch biopsy:\par - Lichenoid dermatitis with subepidermal clefting/blister\par formation, and superficial\par and mid, perivascular and band-like, chronic\par lymphohistiocytic inflammatory\par infiltrate in dermis, with few neutrophils (See Note)\par - Overlying and detached epidermis with partial- and full-\par thickness necrosis is seen\par - Dermal purpura and rare dermal melanophages are noted\par - A PAS special stain fails to reveal the presence of fungal\par microorganisms\par - Additional tissue levels have been examined\par \par 2.  Right arm - DIF, punch biopsy:\par - Negative direct immunofluorescence study for IgM, IgG, and\par IgA deposition\par (See Note)\par - 1+ patchy, weak, granular C3 deposition along the basement\par membrane zone and\par in superficial dermal blood vessels, non-specific

## 2021-01-05 ENCOUNTER — NON-APPOINTMENT (OUTPATIENT)
Age: 36
End: 2021-01-05

## 2021-01-06 ENCOUNTER — NON-APPOINTMENT (OUTPATIENT)
Age: 36
End: 2021-01-06

## 2021-01-06 RX ORDER — VALACYCLOVIR 1 G/1
1 TABLET, FILM COATED ORAL
Qty: 10 | Refills: 0 | Status: ACTIVE | COMMUNITY
Start: 2020-12-31 | End: 1900-01-01

## 2021-01-06 RX ORDER — PREDNISONE 50 MG/1
50 TABLET ORAL DAILY
Qty: 5 | Refills: 0 | Status: ACTIVE | COMMUNITY
Start: 2020-12-31 | End: 1900-01-01

## 2021-01-06 RX ORDER — LIDOCAINE HYDROCHLORIDE 20 MG/ML
2 SOLUTION ORAL; TOPICAL
Qty: 1 | Refills: 0 | Status: ACTIVE | COMMUNITY
Start: 2021-01-06 | End: 1900-01-01

## 2021-01-07 ENCOUNTER — APPOINTMENT (OUTPATIENT)
Dept: DERMATOLOGY | Facility: CLINIC | Age: 36
End: 2021-01-07

## 2021-01-19 ENCOUNTER — APPOINTMENT (OUTPATIENT)
Dept: DERMATOLOGY | Facility: CLINIC | Age: 36
End: 2021-01-19

## 2021-01-20 ENCOUNTER — APPOINTMENT (OUTPATIENT)
Dept: OPHTHALMOLOGY | Facility: CLINIC | Age: 36
End: 2021-01-20

## 2021-02-03 ENCOUNTER — NON-APPOINTMENT (OUTPATIENT)
Age: 36
End: 2021-02-03

## 2021-02-05 ENCOUNTER — EMERGENCY (EMERGENCY)
Facility: HOSPITAL | Age: 36
LOS: 1 days | Discharge: HOME | End: 2021-02-05
Admitting: EMERGENCY MEDICINE
Payer: MEDICAID

## 2021-02-05 VITALS
RESPIRATION RATE: 16 BRPM | HEART RATE: 14 BPM | WEIGHT: 128.09 LBS | OXYGEN SATURATION: 100 % | HEIGHT: 66 IN | TEMPERATURE: 96 F | SYSTOLIC BLOOD PRESSURE: 119 MMHG | DIASTOLIC BLOOD PRESSURE: 76 MMHG

## 2021-02-05 PROCEDURE — 99283 EMERGENCY DEPT VISIT LOW MDM: CPT

## 2021-02-05 RX ORDER — VALACYCLOVIR 500 MG/1
1 TABLET, FILM COATED ORAL
Qty: 14 | Refills: 0
Start: 2021-02-05 | End: 2021-02-11

## 2021-02-05 NOTE — ED PROVIDER NOTE - PATIENT PORTAL LINK FT
You can access the FollowMyHealth Patient Portal offered by [facility] by registering at the following website: [email or web address]. By joining [facility]’s FollowMyHealth portal, you will also be able to view your health information using other applications (apps) compatible with our system.

## 2021-02-05 NOTE — ED PROVIDER NOTE - NS_EDPROVIDERDISPOUSERTYPE_ED_A_ED
I have personally evaluated and examined the patient. The Attending was available to me as a supervising provider if needed.
Statement Selected

## 2021-02-05 NOTE — ED PROVIDER NOTE - PHYSICAL EXAMINATION
Vital signs reviewed.   CONSTITUTIONAL: Pt appears fatigued, well-nourished, but in no apparent distress. Non-toxic appearing.   HEAD: Normocephalic, atraumatic.  EYES: PERRL, EOM intact without pain, conjunctiva and sclera injection noted in left eye with no decreased acuity or discharge.  ENT: Nose midline, nares patent; no rhinorrhea; oropharynx exhibits ulcerated non-bleeding sore to left side of lower lip and inner aspect of right cheek that is very tender to touch. Pt does not tolerate oral exam very well. No tonsillar hypertrophy, no erythema, no exudate, no lymphadenopathy.  NECK/LYMPH: Supple; non-tender; no cervical lymphadenopathy.  CARD: RRR, S1/S2  RESP: Normal chest excursion with respiration, no increased effort of breathing; breath sounds clear and equal bilaterally; no wheezes, rhonchi, or rales.  ABD/GI: soft, non-distended; non-tender; no palpable organomegaly, no pulsatile mass.  EXT/MS: moves all extremities; distal pulses are normal, no pedal edema.  SKIN: Normal for age and race; warm; dry; good turgor; no apparent lesions or exudate noted.  NEURO: Awake, alert, oriented x 3, no apparent focal deficits, CN II-XII grossly intact.  PSYCH: Normal mood; appropriate affect. Vital signs reviewed.   CONSTITUTIONAL: Pt appears fatigued, well-nourished, but in no apparent distress. Non-toxic appearing.   HEAD: Normocephalic, atraumatic.  EYES: PERRL, EOM intact without pain, conjunctiva and sclera injection noted in left eye with no decreased acuity or discharge.  ENT: Nose midline, nares patent; no rhinorrhea; oropharynx exhibits ulcerated non-bleeding grey/yellow sore/shallow ulceration to left side of lower lip and inner aspect of right cheek that is very tender to touch. Pt does not tolerate oral exam very well. No tonsillar hypertrophy, no erythema, no exudate, no lymphadenopathy.  NECK/LYMPH: Supple; non-tender; no cervical lymphadenopathy.  CARD: RRR, S1/S2  RESP: Normal chest excursion with respiration, no increased effort of breathing; breath sounds clear and equal bilaterally; no wheezes, rhonchi, or rales.  ABD/GI: soft, non-distended; non-tender; no palpable organomegaly, no pulsatile mass.  EXT/MS: moves all extremities; distal pulses are normal, no pedal edema.  SKIN: Normal for age and race; warm; dry; good turgor; no apparent lesions or exudate noted.  NEURO: Awake, alert, oriented x 3, no apparent focal deficits, CN II-XII grossly intact.  PSYCH: Normal mood; appropriate affect.

## 2021-02-05 NOTE — ED PROVIDER NOTE - OBJECTIVE STATEMENT
JERRELL Shepherd  36 y/o male with PMH of multiple prior episodes of this same chief complaint, and no other medical history, presents x7 of mouth sores. Pt reports multiple episodes of mouth sore outbreaks over the past year. Reports associated left eye redness and discomfort that occurs with these episodes. Admits has been treated with 4 rounds of valacyclovir and steroids over the past year. The latest round of this treatment ended two weeks ago. Admits "drinks a lot" and 1/2 pack-a-day current smoker. States the mouth sores seem to get worse with alcohol intake. Denies fevers, headaches, n/v/d, dysphagia, STD/STI exposure, allergies, or home meds. Pt admits still able to swallow food but with significant oral pain. Pt has been seen by a Rheumatologist (Dr. Kristopher Lin) who has referred Pt for "special bloodwork" three weeks ago, but has not followed up because Pt "works too much." Pt requests new prescription for valacyclovir and steroids. JERRELL Shepherd  36 y/o male with PMH of multiple prior episodes of this same chief complaint, and no other medical history, presents x7 days with mouth sores. Pt reports multiple episodes of mouth sore outbreaks over the past year. Reports associated left eye redness and discomfort that occurs with these episodes. Admits has been treated with 4 rounds of valacyclovir and steroids over the past year. The latest round of this treatment ended two weeks ago. Admits "drinks a lot" and 1/2 pack-a-day current smoker. States the mouth sores seem to get worse with alcohol intake. Denies fevers, headaches, n/v/d, dysphagia, STD/STI exposure, allergies, or home meds. Pt admits still able to swallow food but with significant oral pain. Pt has been seen by a Rheumatologist (Dr. Kristopher Lin) who has referred Pt for "special bloodwork" three weeks ago, but has not followed up because Pt "works too much." Pt requests new prescription for valacyclovir and steroids.

## 2021-02-05 NOTE — ED PROVIDER NOTE - NSFOLLOWUPINSTRUCTIONS_ED_ALL_ED_FT
REST, NO STRENUOUS ACTIVITY  CONTINUE TAKING ANY CURRENT MEDICATIONS  TAKE ANY NEW MEDICATIONS AS DIRECTED  **FOLLOW UP WITH ENT AND RHEUMATOLOGY AS DIRECTED**  RETURN TO ER FOR WORSENING SYMPTOMS

## 2021-02-05 NOTE — ED PROVIDER NOTE - IV ALTEPLASE INCLUSION HIDDEN
DATE & TIME: 7/30/2020 8465-9728  Cognitive Concerns/ Orientation : A&O x4, forgetful at times  BEHAVIOR & AGGRESSION TOOL COLOR: Green   CIWA SCORE: NA  ABNL VS/O2: BP elevated 158/70, 149/63, other VSS on RA  MOBILITY: UW assist of 1-2 with walker and GB, got up to the chair for lunch. turn & repo q2h.    PAIN MANAGMENT: c/o bilat knee pain, on scheduled Tylenol.   DIET: 2g Na  fair appetite  BOWEL/BLADDER: Incontinent of bowel and bladder. No BM this shift.   ABNL LAB/BG: no labs drawn today, Hgb 7.7 on 7/28, no signs of active bleeding.   DRAIN/DEVICES: PIV access x1 saline locked.   TELEMETRY RHYTHM: NA  SKIN: Pale, intact. BLE & BUE edema 2+. Bel rash improving, powder applied. Mepilex on coccyx for protection, skin intact.   TESTS/PROCEDURES: BMBx tomorrow at noon  D/C DAY/GOALS/PLACE: 1-2 days   OTHER IMPORTANT INFO: Hem/Onc ordered BMBx, however they are unable to do it today, rescheduled for tomorrow at noon.     Update [id number]  BP remains elevated 155/64, other VSS, O2 wnl RA. PTA Eliquis on hold for BMBx tomorrow. NPO from midnight.    show

## 2021-03-28 ENCOUNTER — EMERGENCY (EMERGENCY)
Facility: HOSPITAL | Age: 36
LOS: 1 days | Discharge: HOME | End: 2021-03-28
Attending: EMERGENCY MEDICINE | Admitting: EMERGENCY MEDICINE
Payer: MEDICAID

## 2021-03-28 VITALS
HEART RATE: 108 BPM | RESPIRATION RATE: 16 BRPM | DIASTOLIC BLOOD PRESSURE: 80 MMHG | HEIGHT: 66 IN | TEMPERATURE: 99 F | SYSTOLIC BLOOD PRESSURE: 131 MMHG | OXYGEN SATURATION: 100 %

## 2021-03-28 VITALS
HEART RATE: 90 BPM | DIASTOLIC BLOOD PRESSURE: 73 MMHG | RESPIRATION RATE: 17 BRPM | SYSTOLIC BLOOD PRESSURE: 119 MMHG | OXYGEN SATURATION: 100 % | TEMPERATURE: 99 F

## 2021-03-28 PROCEDURE — 99284 EMERGENCY DEPT VISIT MOD MDM: CPT

## 2021-03-28 RX ORDER — VALACYCLOVIR 500 MG/1
1 TABLET, FILM COATED ORAL
Qty: 7 | Refills: 0
Start: 2021-03-28 | End: 2021-06-16

## 2021-03-28 RX ORDER — VALACYCLOVIR 500 MG/1
1 TABLET, FILM COATED ORAL
Qty: 7 | Refills: 0
Start: 2021-03-28 | End: 2021-04-03

## 2021-03-28 RX ORDER — LIDOCAINE 4 G/100G
30 CREAM TOPICAL ONCE
Refills: 0 | Status: COMPLETED | OUTPATIENT
Start: 2021-03-28 | End: 2021-03-28

## 2021-03-28 RX ORDER — LIDOCAINE 4 G/100G
300 CREAM TOPICAL ONCE
Refills: 0 | Status: DISCONTINUED | OUTPATIENT
Start: 2021-03-28 | End: 2021-03-28

## 2021-03-28 RX ORDER — VALACYCLOVIR 500 MG/1
1000 TABLET, FILM COATED ORAL ONCE
Refills: 0 | Status: COMPLETED | OUTPATIENT
Start: 2021-03-28 | End: 2021-03-28

## 2021-03-28 RX ORDER — LIDOCAINE 4 G/100G
30 CREAM TOPICAL
Qty: 840 | Refills: 0
Start: 2021-03-28 | End: 2021-06-16

## 2021-03-28 RX ORDER — LIDOCAINE 4 G/100G
30 CREAM TOPICAL
Qty: 840 | Refills: 0
Start: 2021-03-28 | End: 2021-04-03

## 2021-03-28 RX ADMIN — VALACYCLOVIR 1000 MILLIGRAM(S): 500 TABLET, FILM COATED ORAL at 12:41

## 2021-03-28 RX ADMIN — Medication 50 MILLIGRAM(S): at 12:27

## 2021-03-28 RX ADMIN — LIDOCAINE 30 MILLILITER(S): 4 CREAM TOPICAL at 12:33

## 2021-03-28 NOTE — ED ADULT NURSE NOTE - OBJECTIVE STATEMENT
Patient received in exam room 27. alert and oriented x4, ambulatory. Presents to ED complaining of sores to lips and inside gums x 1 week. Complaining of pain and burning in mouth. Patient states he has had these symptoms in past and has come to ED for it.  Pt denies difficulty breathing, fevers, chills, chest pain, sob. Meds to be given as per MD orders. Will monitor.

## 2021-03-28 NOTE — ED PROVIDER NOTE - PATIENT PORTAL LINK FT
You can access the FollowMyHealth Patient Portal offered by [facility] by registering at the following website: [email or web address]. By joining [facility]’s FollowMyHealth portal, you will also be able to view your health information using other applications (apps) compatible with our system.

## 2021-03-28 NOTE — ED PROVIDER NOTE - NSFOLLOWUPCLINICS_GEN_ALL_ED_FT
[facility] Rhe[facility]  Rheumatology  [address]  [place] [address]  Phone: [phone]  Fax:     [facility] Dermat[facility]  Dermatology  [address] [facility] [address]  [place] [address]  Phone: [phone]  Fax:   Follow Up Time:

## 2021-03-28 NOTE — ED PROVIDER NOTE - ATTENDING CONTRIBUTION TO CARE
Dr. Meza:  I have personally performed a face to face bedside history and physical examination of this patient. I have discussed the history, examination, review of systems, assessment and plan of management with the resident. I have reviewed the electronic medical record and amended it to reflect my history, review of systems, physical exam, assessment and plan.    35M presents with painful mouth sores and left eye redness.  Has had multiple similar presentations prior, has not yet followed up outpatient because he has been busy at work.  Previous episodes had resolved with Valtrex & prednisone.      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

## 2021-03-28 NOTE — ED PROVIDER NOTE - THROAT FINDINGS
healing sores/ulcers to upper and lower lip; mucosal ulcers to inner lips, inner cheeks, and below tongue

## 2021-03-28 NOTE — ED PROVIDER NOTE - NSFOLLOWUPINSTRUCTIONS_ED_ALL_ED_FT
1) Please follow up with dermatology and Rheumatology within 3-4 days.    2) Take prescription medications as prescribed    3) Please return if develop fever, difficulty breathing, unable to swallow, or any new concerning symptoms.

## 2021-03-28 NOTE — ED ADULT TRIAGE NOTE - CHIEF COMPLAINT QUOTE
Pt presents to ED ambulatory from home with c/o sores to lips and inside gums x 1 week. Pt was seen in this ED for similar issue approx 1month ago and was given medication and felt better. Pt denies difficulty breathing, relates difficulty swallowing and painful while talking.

## 2021-03-28 NOTE — ED PROVIDER NOTE - OBJECTIVE STATEMENT
36 y/o M, PMH of multiple episodes of mouth sores, presents to ED c/o recurrent episode of mouth sores x 1 week a/w L eye redness/itchiness/blurry vision and painful swallowing. Pt notes that last time he had this episode he was given valtrex and prednisone w/ resolution of his symptoms. Pt was given rheumatology f/u on last discharge, but because his symptoms improved he did not follow up. Pt is a 20 yr 1/2 ppd smoker, daily drinker (6 pack of beers), and daily marijuana use. Pt denies f/c, HA, dysphagia, STD/STI exposure/hx, CP/SOB, abd pain, n/v/d, weakness or numbness.

## 2021-03-28 NOTE — ED PROVIDER NOTE - CLINICAL SUMMARY MEDICAL DECISION MAKING FREE TEXT BOX
34 y/o M, PMH of multiple episodes of mouth sores, presents to ED c/o recurrent episode of mouth sores x 1 week a/w L eye redness/itchiness/blurry vision and painful swallowing.  VSS. Physical exam significant for healing sores to lips and mucosal lesions to inner lip, cheeks and below tongue.  Will give valtrex, prednisone, and viscous lidocaine. Encourage rheum and derm f/u. D/c home.

## 2021-05-18 NOTE — ED ADULT NURSE NOTE - NS PRO PASSIVE SMOKE EXP
Unknown An abnormal EKG requiring continuous monitoring/Evidence of an arrythmia seen in the ED requiring continuous monitoring

## 2021-06-10 ENCOUNTER — EMERGENCY (EMERGENCY)
Facility: HOSPITAL | Age: 36
LOS: 1 days | Discharge: HOME | End: 2021-06-10
Attending: EMERGENCY MEDICINE | Admitting: EMERGENCY MEDICINE
Payer: MEDICAID

## 2021-06-10 VITALS
HEART RATE: 82 BPM | HEIGHT: 66 IN | RESPIRATION RATE: 16 BRPM | SYSTOLIC BLOOD PRESSURE: 120 MMHG | TEMPERATURE: 99 F | DIASTOLIC BLOOD PRESSURE: 73 MMHG | OXYGEN SATURATION: 100 %

## 2021-06-10 PROCEDURE — 99283 EMERGENCY DEPT VISIT LOW MDM: CPT

## 2021-06-10 NOTE — ED PROVIDER NOTE - OBJECTIVE STATEMENT
34 yo M with a pmhx of etoh abuse, daily smoker presents to the ED with mouth sores that started 3 days ago. He has has this problem in the past with multiple visit to the ED. his prior visit, he took prednisone which improved his symptoms. He states his pain is localized to his mouth, sharp in nature. He admits to having difficulty swallowing. He denies any other symptoms at bedside. 34 yo M with a pmhx of etoh abuse, daily smoker presents to the ED with mouth sores that started 3 days ago. He has has this problem in the past with multiple visit to the ED. his prior visit, he took prednisone which improved his symptoms. He states his pain is localized to his mouth, sharp in nature. He admits to having difficulty swallowing. He denies any other symptoms at bedside. No blurry vision

## 2021-06-10 NOTE — ED PROVIDER NOTE - PROGRESS NOTE DETAILS
Testicular exam, chaperoned by Dr. Pacheco: unremarkable testicles, no ulcers noted, no hernia or ulcers

## 2021-06-10 NOTE — ED PROVIDER NOTE - NSFOLLOWUPCLINICS_GEN_ALL_ED_FT
[facility] - Ophthalmology  Ophthalmology  [address], [address]  [place] [address]  Phone: [phone]  Fax:   Follow Up Time: 4-6 Days    [facility] Rheumatology  Rheumatology  [address]  [place] [address]  Phone: [phone]  Fax:   Follow Up Time: 4-6 Days

## 2021-06-10 NOTE — ED PROVIDER NOTE - NS ED ROS FT
GENERAL: no fever, chills  HEENT: no cough, congestion, odynophagia, dysphagia  CARDIAC: no chest pain, palpitations, lightheadedness  PULM: no dyspnea, wheezing   GI: no abdominal pain, nausea, vomiting, diarrhea, constipation, melena, hematochezia, (+) mouth sores  : no urinary dysuria, frequency, incontinence, hematuria  NEURO: no headache, motor weakness, sensory changes  MSK: no joint or muscle pain  SKIN: no rashes, hives  HEME: no active bleeding, bruising

## 2021-06-10 NOTE — ED PROVIDER NOTE - SKIN, MLM
Skin normal color for race, warm, dry and intact. No evidence of rash.  Ulcer to lower lip as described above

## 2021-06-10 NOTE — ED PROVIDER NOTE - ATTENDING CONTRIBUTION TO CARE
I performed a face to face evaluation of this patient and performed a full history and physical examination on the patient.  I agree with the resident's history, physical examination, and plan of the patient.  36 yo M with a pmhx of etoh abuse, daily smoker presents to the ED with mouth sores that started 3 days ago. He has this problem in the past with multiple visit to the ED. his prior visit, he took prednisone which improved his symptoms. chart review from prior visit demonstrates normal labs results, negative HIV, and negative herpes. PE as noted above. patient has rheumatologist but has not followed up in 1 year. patient likely has mucositis from underlying autoimmune or inflammatory response. instructed to follow up with rheum and renewed meds.  OP with lower lip ulcer/sore, left eye injected eomi, normal vision, heart and lungs wnl, gu exam wnl, neuro wnl, pt declined lab tests/optho consult further evaluation and will call doctor tomorrow - will give steroids, antivirals.

## 2021-06-10 NOTE — ED PROVIDER NOTE - NSFOLLOWUPINSTRUCTIONS_ED_ALL_ED_FT
-You were seen in the Emergency Department (ED) for mouth sores. Lab and imaging results, if performed, were discussed with you along with your discharge diagnosis.    FOLLOW-UP:  -Please follow up with your rheumatologist and ophthalmology regarding your mouth sores and red eye. A number has been provided for your convenience.   -If you do not have a PMD, please call [phone] to find one convenient for you or call our clinic at [phone].    MEDICATIONS:  -Continue all other prescribed medicine, IF ANY, as per your primary care doctor's (PMD) recommendations.    PAIN CONTROL:  -Please take over the counter Tylenol (also known as acetaminophen) 650mg every 6 hours or Ibuprofen (also known as motrin, advil) 600mg every 8 hour for your pain, IF ANY, unless you are not supposed to for any reason.  -Rest, stay hydrated with plenty of fluids (drink at least 2 Liters or 64 Ounces of water each day UNLESS you are supposed to restrict fluids or ANY reason.    RETURN PRECAUTIONS:  -Please return to the Emergency Department if you experience ANY new or concerning symptoms, such as, but not limited to: worsening pain, large amount of bleeding, passing out, fever >100.F, shaking chills, inability to see or new double vision, chest pain, difficulty breathing, diffuse abdominal pain, unable to eat or drink, continuous vomiting or diarrhea, unable to move or feel part of your body
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
## 2021-06-10 NOTE — ED PROVIDER NOTE - PHYSICAL EXAMINATION
GENERAL: no acute distress, non-toxic appearing  HEAD: normocephalic, atraumatic  HEENT: normal conjunctiva, oral mucosa moist, neck supple  CARDIAC: regular rate and rhythm, normal S1 and S2,  no appreciable murmurs  PULM: clear to ascultation bilaterally, no crackles, rales, rhonchi, or wheezing  GI: abdomen nondistended, soft, nontender, no guarding or rebound tenderness, (+) multiple oral ulcers without any discahrge or bleeding, no dental caries  : no CVA tenderness, no suprapubic tenderness  NEURO: alert and oriented x 3, normal speech, PERRLA, EOMI, no focal motor or sensory deficits, nonantalgic gait  MSK: no visible deformities, no peripheral edema, calf tenderness/redness/swelling  SKIN: no visible rashes, dry, well-perfused  PSYCH: appropriate mood and affect GENERAL: no acute distress, non-toxic appearing  HEAD: normocephalic, atraumatic  left eye redness/injection eomi, visual acuity intact  HEENT: normal conjunctiva, oral mucosa moist, neck supple mouth/lip sore left lower lip  CARDIAC: regular rate and rhythm, normal S1 and S2,  no appreciable murmurs  PULM: clear to ascultation bilaterally, no crackles, rales, rhonchi, or wheezing  GI: abdomen nondistended, soft, nontender, no guarding or rebound tenderness, (+) multiple oral ulcers without any discahrge or bleeding, no dental caries  : no CVA tenderness, no suprapubic tenderness  NEURO: alert and oriented x 3, normal speech, PERRLA, EOMI, no focal motor or sensory deficits, nonantalgic gait  MSK: no visible deformities, no peripheral edema, calf tenderness/redness/swelling  SKIN: no visible rashes, dry, well-perfused  PSYCH: appropriate mood and affect

## 2021-06-10 NOTE — ED PROVIDER NOTE - PATIENT PORTAL LINK FT
You can access the FollowMyHealth Patient Portal offered by [facility] by registering at the following website: [email or web address]. By joining [employer]’s FollowMyHealth portal, you will also be able to view your health information using other applications (apps) compatible with our system.

## 2021-07-05 ENCOUNTER — EMERGENCY (EMERGENCY)
Facility: HOSPITAL | Age: 36
LOS: 1 days | Discharge: HOME | End: 2021-07-05
Attending: EMERGENCY MEDICINE | Admitting: EMERGENCY MEDICINE
Payer: MEDICAID

## 2021-07-05 VITALS
TEMPERATURE: 98 F | DIASTOLIC BLOOD PRESSURE: 74 MMHG | SYSTOLIC BLOOD PRESSURE: 108 MMHG | HEART RATE: 83 BPM | RESPIRATION RATE: 17 BRPM | OXYGEN SATURATION: 100 % | HEIGHT: 66 IN

## 2021-07-05 PROCEDURE — 99283 EMERGENCY DEPT VISIT LOW MDM: CPT

## 2021-07-05 NOTE — ED ADULT TRIAGE NOTE - CHIEF COMPLAINT QUOTE
pt c/o ulcers to mouth x 2 days, patient states has been seen in ED 3 times for similar symptoms, given abx with relief in past. Pt states notices ulcers return after drinking alcohol. Denies difficulty breathing, no drooling noted, respirations even and unlabored.

## 2021-07-06 RX ORDER — VALACYCLOVIR 500 MG/1
1 TABLET, FILM COATED ORAL
Qty: 14 | Refills: 0
Start: 2021-07-06 | End: 2021-07-12

## 2021-07-06 RX ORDER — VALACYCLOVIR 500 MG/1
1000 TABLET, FILM COATED ORAL ONCE
Refills: 0 | Status: COMPLETED | OUTPATIENT
Start: 2021-07-06 | End: 2021-07-06

## 2021-07-06 RX ADMIN — Medication 50 MILLIGRAM(S): at 00:27

## 2021-07-06 RX ADMIN — VALACYCLOVIR 1000 MILLIGRAM(S): 500 TABLET, FILM COATED ORAL at 00:31

## 2021-07-06 RX ADMIN — VALACYCLOVIR 1000 MILLIGRAM(S): 500 TABLET, FILM COATED ORAL at 00:27

## 2021-07-06 NOTE — ED PROVIDER NOTE - OBJECTIVE STATEMENT
Attendinyo male presents with mouth sores on the lip and inner lip and inside of cheeks.  developed 2 days ago.  pt states he has had this multiple  times in the past and they resolve with steroids and valacyclovir.  pt has an appointment with his PCP in week.  no chest pain or shortness of breath.  no nausea or vomiting.  has pain with eating/swallowing.  no vomiting.

## 2021-07-06 NOTE — ED PROVIDER NOTE - PATIENT PORTAL LINK FT
You can access the FollowMyHealth Patient Portal offered by [facility] by registering at the following website: [email or web address]. By joining [employer]’s FollowMyHealth portal, you will also be able to view your health information using other applications (apps) compatible with our system.

## 2021-07-06 NOTE — ED PROVIDER NOTE - PHYSICAL EXAMINATION
left eye with conjunctival injection.  vesicular lesion on the lower lip and inner cheeks.  moist mucous membranes

## 2021-07-06 NOTE — ED PROVIDER NOTE - NSFOLLOWUPINSTRUCTIONS_ED_ALL_ED_FT
Follow up with your doctor in 2-3 days.  Return to the ED for worse pain, trouble swallowing or other emergent concerns.  Take prednisone taper as directed.    Take valacyclovir 1000mg 2x/day for 7 days.

## 2021-07-06 NOTE — ED PROVIDER NOTE - CLINICAL SUMMARY MEDICAL DECISION MAKING FREE TEXT BOX
Mucositis and vesicular lesions on the mouth.  likely autoimmune process.  pt states he has followup with doctor this week.  offered IV hydration but pt states he is tolerating po water and would just like antivirals, steroids and to be discharged so he can followup later this week.
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
## 2021-08-29 ENCOUNTER — EMERGENCY (EMERGENCY)
Facility: HOSPITAL | Age: 36
LOS: 1 days | Discharge: HOME | End: 2021-08-29
Attending: STUDENT IN AN ORGANIZED HEALTH CARE EDUCATION/TRAINING PROGRAM | Admitting: STUDENT IN AN ORGANIZED HEALTH CARE EDUCATION/TRAINING PROGRAM
Payer: MEDICAID

## 2021-08-29 VITALS
HEIGHT: 66 IN | SYSTOLIC BLOOD PRESSURE: 135 MMHG | TEMPERATURE: 99 F | RESPIRATION RATE: 18 BRPM | DIASTOLIC BLOOD PRESSURE: 89 MMHG | HEART RATE: 102 BPM | OXYGEN SATURATION: 100 %

## 2021-08-29 PROCEDURE — 99283 EMERGENCY DEPT VISIT LOW MDM: CPT

## 2021-08-29 RX ORDER — VALACYCLOVIR 500 MG/1
1 TABLET, FILM COATED ORAL
Qty: 14 | Refills: 0
Start: 2021-08-29 | End: 2021-09-04

## 2021-08-29 RX ORDER — VALACYCLOVIR 500 MG/1
1000 TABLET, FILM COATED ORAL ONCE
Refills: 0 | Status: COMPLETED | OUTPATIENT
Start: 2021-08-29 | End: 2021-08-29

## 2021-08-29 RX ADMIN — Medication 40 MILLIGRAM(S): at 14:03

## 2021-08-29 RX ADMIN — VALACYCLOVIR 1000 MILLIGRAM(S): 500 TABLET, FILM COATED ORAL at 13:37

## 2021-08-29 NOTE — ED PROVIDER NOTE - NSFOLLOWUPCLINICS_GEN_ALL_ED_FT
[facility] Rheumatology  Rheumatology  [address]  [place] [address]  Phone: [phone]  Fax:     [facility]  Rheumatology  [address] [place] [address]  Phone: [phone]  Fax: [phone]

## 2021-08-29 NOTE — ED PROVIDER NOTE - CLINICAL SUMMARY MEDICAL DECISION MAKING FREE TEXT BOX
Mr. Koch is a 34 yo man presenting with multiple episodes of mouth sores previously treated by valacyclovir and prednisone who presents with mouth sores. The sores are concerning for Behcet's, or HSV. Pt has previously had workup for viral causes with no active infections isolated, negative HSV. Will treat pt with valacyclovir and prednisone. Will encourage pt to follow up with rheumatology outpatient for long term treatment. Mr. Koch is a 34 yo man presenting with multiple episodes of mouth sores previously treated by valacyclovir and prednisone who presents with mouth sores. The sores are concerning for Behcet's, or HSV. Pt has previously had workup for viral causes with no active infections isolated, negative HSV. Will treat pt with valacyclovir and prednisone. Will encourage pt to follow up with rheumatology outpatient for long term treatment.    Please see attending attestation.

## 2021-08-29 NOTE — ED PROVIDER NOTE - NSFOLLOWUPINSTRUCTIONS_ED_ALL_ED_FT
You were seen in the [facility] emergency department today for mouth sores. You have been prescribed two medications for treatment; valacyclovir and prednisone. Take your medications as prescribed.  Ulcers are open sores that may be shallow or deep. You may have one or more sores at a time, and they may grow in clusters.     Contact your healthcare provider if:    •You cannot eat or drink because of your mouth pain.  •Your canker sores are not gone after 3 to 4 weeks.  •Your pain does not go away after you take medicines.  •Your sores are getting worse or you are getting more sores, even after treatment.  •You have questions or concerns about your condition or care.    Follow up with rheumatology for further treatment in the next 3-5 days.     If you have any severe increase in pain, fever, uncontrollable nausea vomiting, or inability to tolerate eating and drinking you need to come right back to the emergency room.    You may take 600mg Ibuprofen (Advil) once every 8 hours as needed for pain. See medication label for warnings and use instructions.

## 2021-08-29 NOTE — ED PROVIDER NOTE - ATTENDING CONTRIBUTION TO CARE
I have personally performed a face to face medical and diagnostic evaluation of the patient. I have discussed with and reviewed the Resident's note and agree with the History, ROS, Physical Exam and MDM unless otherwise indicated. A brief summary of my personal evaluation and impression can be found below.    36yo M no pmx p/w 4 days of mouth sore on soft mucosa and tongue. Has had multiple episodes and ED evals for similar that improve with valtrex and steroids. Has fu with rheum but has not followed up yet. Previous smears neg for HSV and was told by pcp poss behcets and needs further eval by his rheum. Also notes his R eye is chronically injected that typically worsen with flares but no new vision changes. No fever, chills, cp, sob, n/v, genital sx, urinary sx. Tolerating PO. Feels like typical previous episodes and requesting meds and will fu with his rheum.  VITALS: Initial triage and subsequent vitals have been reviewed by me.  Gen: Well appearing, NAD, alert, non-toxic  Head: NCAT  HEENT: MMM, R injected conjunctiva (no dendritic lesions on fluorescein), anicteric, neck supple, oropharynx w/ multiple grey-based aphthous ulcerations/erosions to mucosa  Lung: CTAB, no adventitious sounds  CV: RRR, no murmurs, 2+symmetric peripheral pulses  Abd: soft, NTND, no rebound or guarding, no palpable masses  MSK: No edema, no visible deformities  Neuro: Moving all extremity grossly, following commands appropriately, fluid speech  Skin: Warm and dry, no evidence of rash  Psych: normal mood and affect  Recurrent episode sof mouth sores thought 2/2 autoimmune dz. No systemic sx. Tolerating PO. No vision changes and fluorescein stain neg. Meds and rheum fu with return precautions.

## 2021-08-29 NOTE — ED PROVIDER NOTE - PATIENT PORTAL LINK FT
You can access the FollowMyHealth Patient Portal offered by [facility] by registering at the following website: [email or web address]. By joining [employer]’s FollowMyHealth portal, you will also be able to view your health information using other applications (apps) compatible with our system.

## 2021-08-29 NOTE — ED PROVIDER NOTE - PROGRESS NOTE DETAILS
Kristopher Majano DO - given fu to pt and return precaution and discharge instructions. Answered all questions.

## 2021-08-29 NOTE — ED ADULT NURSE NOTE - OBJECTIVE STATEMENT
Patient is a 35-year-old male, A&OX3, ambulatory c/o mouth sores to mouth and lips. Hx of alcohol use, rash. States has had similar episodes in the past. Will cont. to monitor.

## 2021-08-29 NOTE — ED PROVIDER NOTE - NSICDXFAMILYHX_GEN_ALL_CORE_FT
FAMILY HISTORY:  Mother  Still living? Unknown  Family history of cirrhosis of liver, Age at diagnosis: Age Unknown     Plan is for discharge to [facility]  Medicine will sign off please reconsult as needed

## 2021-08-29 NOTE — ED PROVIDER NOTE - OBJECTIVE STATEMENT
36 y/o male with PMH of multiple prior episodes of this same chief complaint, and no other medical history, presents  days with mouth sores. Pt reports multiple episodes of mouth sore outbreaks over the past year. He has R eye pain/redness that occurs with these episodes. Admits has been treated with 5 rounds of valacyclovir and steroids over the past year.  Denies fevers, headaches, n/v/d, chest pain, cough, genital sores. Pt notes his PO intake has decreased due to pain with eating despite using viscous lidocaine.  Pt requests new prescription for valacyclovir and steroids. 36 y/o male with PMH of multiple prior episodes of this same chief complaint, and no other medical history, presents 4 days with mouth sores. Pt reports multiple episodes of mouth sore outbreaks over the past year. He has R eye pain/redness that occurs with these episodes. Admits has been treated with 5 rounds of valacyclovir and steroids over the past year. He notes that it worsens with alcohol use, admits to being frequent user. Denies fevers, headaches, n/v/d, chest pain, cough, genital sores. Pt notes his PO intake has decreased due to pain with eating despite using viscous lidocaine.  Pt requests new prescription for valacyclovir and steroids. Pt was meant to see a rheumatologist and establish care with a PCP but hasn't had time. He notes he has some blurry vision in his right eye but it requires him to wipe his eye using a tissue. He often wakes up with crust on his eye. After further clarification pt explains his eye worsens during these outbreaks but never fully improves. 34 y/o male with PMH of multiple prior episodes of this same chief complaint, and no other medical history, presents 4 days with mouth sores. Pt reports multiple episodes of mouth sore outbreaks over the past year. He has L eye pain/redness that occurs with these episodes. Admits has been treated with 5 rounds of valacyclovir and steroids over the past year. He notes that it worsens with alcohol use, admits to being frequent user. Denies fevers, headaches, n/v/d, chest pain, cough, genital sores. Pt notes his PO intake has decreased due to pain with eating despite using viscous lidocaine.  Pt requests new prescription for valacyclovir and steroids. Pt was meant to see a rheumatologist and establish care with a PCP but hasn't had time. He notes he has some blurry vision in his left eye but it requires him to wipe his eye using a tissue. He often wakes up with crust on his eye. After further clarification pt explains his eye worsens during these outbreaks but never fully improves. 36 y/o male with PMH of multiple prior episodes of this same chief complaint, and no other medical history, presents 4 days with mouth sores. Pt reports multiple episodes of mouth sore outbreaks over the past year. He has L eye pain/redness that occurs with these episodes. Admits has been treated with 5 rounds of valacyclovir and steroids over the past year. He notes that it worsens with alcohol use, admits to being frequent user. Denies fevers, headaches, n/v/d, chest pain, cough, genital sores. Pt notes his PO intake has decreased due to pain with eating despite using viscous lidocaine.  Pt requests new prescription for valacyclovir and steroids. Pt was meant to see a rheumatologist and establish care with a PCP but hasn't had time. He notes he has some blurry vision in his left eye but it requires him to wipe his eye using a tissue and then improves. He often wakes up with crust on his eye. After further clarification pt explains his eye worsens during these outbreaks but never fully improves.

## 2022-01-23 ENCOUNTER — EMERGENCY (EMERGENCY)
Facility: HOSPITAL | Age: 37
LOS: 1 days | Discharge: HOME | End: 2022-01-23
Attending: EMERGENCY MEDICINE | Admitting: EMERGENCY MEDICINE
Payer: MEDICAID

## 2022-01-23 VITALS
SYSTOLIC BLOOD PRESSURE: 118 MMHG | TEMPERATURE: 99 F | OXYGEN SATURATION: 100 % | HEIGHT: 66 IN | HEART RATE: 100 BPM | RESPIRATION RATE: 18 BRPM | DIASTOLIC BLOOD PRESSURE: 84 MMHG

## 2022-01-23 VITALS
SYSTOLIC BLOOD PRESSURE: 116 MMHG | RESPIRATION RATE: 18 BRPM | TEMPERATURE: 98 F | DIASTOLIC BLOOD PRESSURE: 85 MMHG | HEART RATE: 91 BPM | OXYGEN SATURATION: 99 %

## 2022-01-23 LAB
ALBUMIN SERPL ELPH-MCNC: 5.1 G/DL — HIGH (ref 3.3–5)
ALP SERPL-CCNC: 90 U/L — SIGNIFICANT CHANGE UP (ref 40–120)
ALT FLD-CCNC: 53 U/L — HIGH (ref 4–41)
ANION GAP SERPL CALC-SCNC: 19 MMOL/L — HIGH (ref 7–14)
AST SERPL-CCNC: 53 U/L — HIGH (ref 4–40)
BASOPHILS # BLD AUTO: 0.04 K/UL — SIGNIFICANT CHANGE UP (ref 0–0.2)
BASOPHILS NFR BLD AUTO: 0.6 % — SIGNIFICANT CHANGE UP (ref 0–2)
BILIRUB SERPL-MCNC: 1.4 MG/DL — HIGH (ref 0.2–1.2)
BUN SERPL-MCNC: 10 MG/DL — SIGNIFICANT CHANGE UP (ref 7–23)
CALCIUM SERPL-MCNC: 9.6 MG/DL — SIGNIFICANT CHANGE UP (ref 8.4–10.5)
CHLORIDE SERPL-SCNC: 92 MMOL/L — LOW (ref 98–107)
CO2 SERPL-SCNC: 21 MMOL/L — LOW (ref 22–31)
CREAT SERPL-MCNC: 0.7 MG/DL — SIGNIFICANT CHANGE UP (ref 0.5–1.3)
EOSINOPHIL # BLD AUTO: 0.05 K/UL — SIGNIFICANT CHANGE UP (ref 0–0.5)
EOSINOPHIL NFR BLD AUTO: 0.7 % — SIGNIFICANT CHANGE UP (ref 0–6)
GLUCOSE SERPL-MCNC: 84 MG/DL — SIGNIFICANT CHANGE UP (ref 70–99)
HCT VFR BLD CALC: 44.8 % — SIGNIFICANT CHANGE UP (ref 39–50)
HGB BLD-MCNC: 15.4 G/DL — SIGNIFICANT CHANGE UP (ref 13–17)
IANC: 5.34 K/UL — SIGNIFICANT CHANGE UP (ref 1.5–8.5)
IMM GRANULOCYTES NFR BLD AUTO: 0.1 % — SIGNIFICANT CHANGE UP (ref 0–1.5)
LYMPHOCYTES # BLD AUTO: 1.02 K/UL — SIGNIFICANT CHANGE UP (ref 1–3.3)
LYMPHOCYTES # BLD AUTO: 14 % — SIGNIFICANT CHANGE UP (ref 13–44)
MAGNESIUM SERPL-MCNC: 1.7 MG/DL — SIGNIFICANT CHANGE UP (ref 1.6–2.6)
MCHC RBC-ENTMCNC: 31.8 PG — SIGNIFICANT CHANGE UP (ref 27–34)
MCHC RBC-ENTMCNC: 34.4 GM/DL — SIGNIFICANT CHANGE UP (ref 32–36)
MCV RBC AUTO: 92.4 FL — SIGNIFICANT CHANGE UP (ref 80–100)
MONOCYTES # BLD AUTO: 0.81 K/UL — SIGNIFICANT CHANGE UP (ref 0–0.9)
MONOCYTES NFR BLD AUTO: 11.1 % — SIGNIFICANT CHANGE UP (ref 2–14)
NEUTROPHILS # BLD AUTO: 5.34 K/UL — SIGNIFICANT CHANGE UP (ref 1.8–7.4)
NEUTROPHILS NFR BLD AUTO: 73.5 % — SIGNIFICANT CHANGE UP (ref 43–77)
NRBC # BLD: 0 /100 WBCS — SIGNIFICANT CHANGE UP
NRBC # FLD: 0 K/UL — SIGNIFICANT CHANGE UP
PHOSPHATE SERPL-MCNC: 3.4 MG/DL — SIGNIFICANT CHANGE UP (ref 2.5–4.5)
PLATELET # BLD AUTO: 164 K/UL — SIGNIFICANT CHANGE UP (ref 150–400)
POTASSIUM SERPL-MCNC: 4.4 MMOL/L — SIGNIFICANT CHANGE UP (ref 3.5–5.3)
POTASSIUM SERPL-SCNC: 4.4 MMOL/L — SIGNIFICANT CHANGE UP (ref 3.5–5.3)
PROT SERPL-MCNC: 7.9 G/DL — SIGNIFICANT CHANGE UP (ref 6–8.3)
RBC # BLD: 4.85 M/UL — SIGNIFICANT CHANGE UP (ref 4.2–5.8)
RBC # FLD: 13.3 % — SIGNIFICANT CHANGE UP (ref 10.3–14.5)
SODIUM SERPL-SCNC: 132 MMOL/L — LOW (ref 135–145)
WBC # BLD: 7.27 K/UL — SIGNIFICANT CHANGE UP (ref 3.8–10.5)
WBC # FLD AUTO: 7.27 K/UL — SIGNIFICANT CHANGE UP (ref 3.8–10.5)

## 2022-01-23 PROCEDURE — 99284 EMERGENCY DEPT VISIT MOD MDM: CPT | Mod: 25

## 2022-01-23 PROCEDURE — 93010 ELECTROCARDIOGRAM REPORT: CPT

## 2022-01-23 RX ORDER — SODIUM CHLORIDE 9 MG/ML
1000 INJECTION INTRAMUSCULAR; INTRAVENOUS; SUBCUTANEOUS ONCE
Refills: 0 | Status: COMPLETED | OUTPATIENT
Start: 2022-01-23 | End: 2022-01-23

## 2022-01-23 RX ORDER — VALACYCLOVIR 500 MG/1
1000 TABLET, FILM COATED ORAL ONCE
Refills: 0 | Status: COMPLETED | OUTPATIENT
Start: 2022-01-23 | End: 2022-01-23

## 2022-01-23 RX ORDER — VALACYCLOVIR 500 MG/1
1 TABLET, FILM COATED ORAL
Qty: 19 | Refills: 0
Start: 2022-01-23 | End: 2022-02-01

## 2022-01-23 RX ORDER — VALACYCLOVIR 500 MG/1
1 TABLET, FILM COATED ORAL
Qty: 19 | Refills: 0
Start: 2022-01-23 | End: 2022-06-20

## 2022-01-23 RX ADMIN — VALACYCLOVIR 1000 MILLIGRAM(S): 500 TABLET, FILM COATED ORAL at 13:09

## 2022-01-23 RX ADMIN — SODIUM CHLORIDE 1000 MILLILITER(S): 9 INJECTION INTRAMUSCULAR; INTRAVENOUS; SUBCUTANEOUS at 12:04

## 2022-01-23 RX ADMIN — Medication 50 MILLIGRAM(S): at 13:07

## 2022-01-23 RX ADMIN — Medication 50 MILLIGRAM(S): at 13:08

## 2022-01-23 NOTE — ED PROVIDER NOTE - PATIENT PORTAL LINK FT
You can access the FollowMyHealth Patient Portal offered by [facility] by registering at the following website: [email or web address]. By joining [employer]’s FollowMyHealth portal, you will also be able to view your health information using other applications (apps) compatible with our system.

## 2022-01-23 NOTE — ED ADULT NURSE NOTE - CHIEF COMPLAINT QUOTE
pt with Hx. Alcohol abuse coming with mouth sores, not able to swallow, last drink 2-3 days ago, some hand.

## 2022-01-23 NOTE — ED ADULT NURSE NOTE - OBJECTIVE STATEMENT
35y/o male A&ox4, ambulatory received in rm21. pt c/o mouth sores for a few days. hx of prior mouth sore exacerbated with alcohol intake. last drink 3 days ago, daily drinker, denies past alcohol withdrawal, current CIWA score 5. denies mouth swelling, sob, chest pain, dizziness, lightheadedness, headache. respirations even and unlabored. pt completing full sentences, no drooling/mouth swelling noted. pt states "this is the 3rd time these mouth sore are coming back." MD at bedside for philip. bed in lowest position, side rails up, call bell in hand, safety maintained. awaiting further orders. will continue to monitor.

## 2022-01-23 NOTE — ED PROVIDER NOTE - HISTORY ATTESTATION, MLM
Writer assisted patient with toileting to [facility]. shelter. Security contacted for update. Writer advised patient if shelter found, a cab voucher has been approved by house supervisor. Patient has no further questions/concerns.    I have reviewed and confirmed nurses' notes...

## 2022-01-23 NOTE — ED PROVIDER NOTE - CLINICAL SUMMARY MEDICAL DECISION MAKING FREE TEXT BOX
35 yo M with hx of multiple prior episodes of mouth sores p/w recurrence of oral sores. Similar to previous episodes, treated with valacyclovir/prednisone. Multiple erythematous lesions in buccal mucosa with upper lip lesion. Clinical presentation c/w herpes infection. Will send basic labs as patient has decreased oral intake. Will monitor for s/s of withdrawal. Plan for labs, fluids, ekg and reassess.

## 2022-01-23 NOTE — ED PROVIDER NOTE - ATTENDING CONTRIBUTION TO CARE
Attending Statement: I have personally seen and examined this patient. I have fully participated in the care of this patient. I have reviewed all pertinent clinical information, including history physical exam, plan and the Resident's note and agree except as noted   37yo M hx of ETOH abuse, hx of oral ulcers pw mouth sores. Has had ulcers for years, on/off, dx w HSV on valtrex. painful to eat/drink.  Pain of lips and sides of mouth. no fever. no rash no cp/sob. no abdominal pain. no n/v/d Currently not on valtrex. Drinking beer  Vital signs noted. Sitting up. ulcer on the upper mid lip. ulcer of posterior pharynx and sides mucosa. no bleeding. no discharge. no drooling. No resp distress. able to speak in full and clear sentences. no wheeze, rales or stridor. no rash on body. not tremulous. AO3.   plan IVF, valtrex, labs, reviewed prior chart

## 2022-01-23 NOTE — ED PROVIDER NOTE - NSFOLLOWUPINSTRUCTIONS_ED_ALL_ED_FT
You were evaluated in the Emergency Department for mouth pain with a presumptive diagnosis of herpes infection.       There are no signs of emergency conditions requiring admission to the hospital on today's workup.      We recommend that you see your primary care physician within the next 3 days for follow up.  Bring a copy of your discharge paperwork (including any test results) to your doctor.    Please take the medications which were sent to your pharmacy as prescribed.     ***Return to the emergency department if you have any other new/concerning symptoms, including but not limited to: WORSENING PAIN, FEVERS/CHILLS, BLEEDING FROM THE MOUTH***

## 2022-01-23 NOTE — ED PROVIDER NOTE - OBJECTIVE STATEMENT
37 yo M with hx of multiple prior episodes of mouth sores p/w recurrence of oral sores. Reports pain at the site of several intraoral sores. Reports pain with eating/drinking. Reports in the past has been treated with steroids and valacyclovir with relief. Denies difficulty swallowing, bleeding from the mouth, fevers/chills. Has been using oral lidocaine for the pain with some relief. Denies skin rash. States that he is a daily drinker, unable to drink alcohol for 2 days. Denies hx of withdrawal, admissions for alcohol use, intubations in the past. Was told to follow up with dermatology during last presentation, but did not because the symptoms went away.

## 2022-01-23 NOTE — ED PROVIDER NOTE - PHYSICAL EXAMINATION
Gen: NAD, AAOx3, non-toxic appearing.  HEENT: Multiple erythematous 1-2 mm lesions overlying buccal mucosa. Upper lip ulcer. Mild tongue fasciculations.   Lung: speaking in full sentences, good aeration bilaterally, lungs CTA b/l.  CV: regular rate and rhythm. no leg swelling.   Abd: soft, ND, NT.  MSK: no visible deformities. no extremity tremor appreciated.   Neuro: No focal deficits.  Skin: Intact.   Psych: normal affect.

## 2022-03-20 ENCOUNTER — EMERGENCY (EMERGENCY)
Facility: HOSPITAL | Age: 37
LOS: 1 days | Discharge: HOME | End: 2022-03-20
Attending: EMERGENCY MEDICINE | Admitting: EMERGENCY MEDICINE
Payer: MEDICAID

## 2022-03-20 VITALS
RESPIRATION RATE: 19 BRPM | SYSTOLIC BLOOD PRESSURE: 130 MMHG | HEART RATE: 103 BPM | OXYGEN SATURATION: 100 % | DIASTOLIC BLOOD PRESSURE: 98 MMHG | TEMPERATURE: 98 F | HEIGHT: 66 IN

## 2022-03-20 PROCEDURE — 99283 EMERGENCY DEPT VISIT LOW MDM: CPT

## 2022-03-20 RX ORDER — VALACYCLOVIR 500 MG/1
1 TABLET, FILM COATED ORAL
Qty: 20 | Refills: 0
Start: 2022-03-20 | End: 2022-03-29

## 2022-03-20 RX ORDER — IBUPROFEN 200 MG
600 TABLET ORAL ONCE
Refills: 0 | Status: COMPLETED | OUTPATIENT
Start: 2022-03-20 | End: 2022-03-20

## 2022-03-20 RX ORDER — DIPHENHYDRAMINE HYDROCHLORIDE AND LIDOCAINE HYDROCHLORIDE AND ALUMINUM HYDROXIDE AND MAGNESIUM HYDRO
15 KIT ONCE
Refills: 0 | Status: COMPLETED | OUTPATIENT
Start: 2022-03-20 | End: 2022-03-20

## 2022-03-20 RX ORDER — DIPHENHYDRAMINE HYDROCHLORIDE AND LIDOCAINE HYDROCHLORIDE AND ALUMINUM HYDROXIDE AND MAGNESIUM HYDRO
10 KIT
Qty: 200 | Refills: 0
Start: 2022-03-20 | End: 2022-03-29

## 2022-03-20 RX ADMIN — DIPHENHYDRAMINE HYDROCHLORIDE AND LIDOCAINE HYDROCHLORIDE AND ALUMINUM HYDROXIDE AND MAGNESIUM HYDRO 15 MILLILITER(S): KIT at 12:03

## 2022-03-20 RX ADMIN — Medication 600 MILLIGRAM(S): at 11:51

## 2022-03-20 NOTE — ED PROVIDER NOTE - CLINICAL SUMMARY MEDICAL DECISION MAKING FREE TEXT BOX
symptom management, script for acyclovir sent to pharmacy, recommendation out patient id follow up symptom management, script for acyclovir sent to pharmacy, recommendation out patient id follow up with ID

## 2022-03-20 NOTE — ED ADULT TRIAGE NOTE - CHIEF COMPLAINT QUOTE
Pt arrives from home c/o "lacerations to my gums and tongue." No active bleeding. Pt states he was evaluated at this E.D. 2 months ago, was dx'd with a blood infection and dc'd with abx. Denies other PMH. Pt arrives from home c/o "lacerations to my gums and tongue." No active bleeding. Pt states he was evaluated at this E.D. 2 months ago, was dx'd with a blood infection and dc'd with abx. PMH: ETOH abuse, states that last drink >1 wk ago, was drinking 1/4 pint of ale per day. Denies drug use.

## 2022-03-20 NOTE — ED PROVIDER NOTE - NSPTACCESSSVCSAPPTDETAILS_ED_ALL_ED_FT
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   denies any cancer, hiv, sti

## 2022-03-20 NOTE — ED PROVIDER NOTE - NS ED ATTENDING STATEMENT MOD
This was a shared visit with the KAVON. I reviewed and verified the documentation and independently performed the documented:

## 2022-03-20 NOTE — ED PROVIDER NOTE - NSICDXPASTMEDICALHX_GEN_ALL_CORE_FT
PAST MEDICAL HISTORY:  Alcohol abuse     H/O erythema multiforme     Herpetic gingivostomatitis     Rash

## 2022-03-20 NOTE — ED PROVIDER NOTE - ATTENDING CONTRIBUTION TO CARE
agree with NP note    "This is a 36 yr old M, pmh alcohol abuse, herpetic gingivostomatitis with c/o sores multiple oral lesion in the mouth. Pt reports it started about 2-3 years ago , he has flare up approximately every 6 month worsening when his is binge drinking. Last fare up about 2 month ago. During the last fare up evaluating team recommended infectious diease follow up, bu this lesions resolved and never followed up with ID team. Reports difficulty of eating and drinking. Denies fever, chills, sob."    PE: uncomfortable, afebrile, limited opening of mouth due to pain; ulcers along gum lines and inner palate; tongue not elevated; no swelling; CTAB/L    significant stomatitis; tends to have after binge drinking; has not followed up with ID and states will do so now; magic mouthwash; dc home

## 2022-03-20 NOTE — ED PROVIDER NOTE - PATIENT PORTAL LINK FT
You can access the FollowMyHealth Patient Portal offered by [facility] by registering at the following website: [email or web address]. By joining [employer]’s FollowMyHealth portal, you will also be able to view your health information using other applications (apps) compatible with our system.

## 2022-03-20 NOTE — ED PROVIDER NOTE - NSFOLLOWUPINSTRUCTIONS_ED_ALL_ED_FT
Please take medication as prescribed to you. Please follow up with infectious disease doctor as your earliest connivence.     Gingivostomatitis    WHAT YOU NEED TO KNOW:    Gingivostomatitis (GS) is a condition that causes painful sores on the lips, tongue, gums, and inside the mouth. GS is caused by the herpes simplex virus. The virus spreads easily from person to person through saliva or shared objects. The sores usually heal within 2 weeks with treatment.    DISCHARGE INSTRUCTIONS:    Return to the emergency department if:   •You have severe pain.          Contact your healthcare provider if:   •Your fever or other symptoms return after treatment.      •You are urinating less than usual.      •Your mouth sores are draining pus or blood.      •You have questions or concerns about your condition or care.      Medicines: You may need any of the following:   •Acetaminophen decreases pain and fever. It is available without a doctor's order. Ask how much to take and how often to take it. Follow directions. Acetaminophen can cause liver damage if not taken correctly.      •NSAIDs, such as ibuprofen, help decrease swelling, pain, and fever. This medicine is available with or without a doctor's order. NSAIDs can cause stomach bleeding or kidney problems in certain people. If you take blood thinner medicine, always ask your healthcare provider if NSAIDs are safe for you. Always read the medicine label and follow directions.      •Numbing medicine helps decrease pain from your mouth sores. This medicine is usually a liquid that you swish in your mouth and then spit out.       •Antiviral medicine helps treat a viral infection.      •Take your medicine as directed. Contact your healthcare provider if you think your medicine is not helping or if you have side effects. Tell him of her if you are allergic to any medicine. Keep a list of the medicines, vitamins, and herbs you take. Include the amounts, and when and why you take them. Bring the list or the pill bottles to follow-up visits. Carry your medicine list with you in case of an emergency.      Manage your symptoms:   •Brush your teeth at least 2 times each day. Floss at least 1 time each day. If you wear dentures, make sure they fit properly.       •Drink liquids as directed to prevent dehydration. It is important to drink liquids even though your mouth is sore. Ask how much liquid to drink each day and which liquids are best for you.       •Eat a variety of healthy foods. You may need to eat bland foods until your pain gets better. Healthy foods include fruits, vegetables, whole-grain breads, low-fat dairy products, beans, lean meats, and fish. Do not eat spicy, dry, hard, or acidic foods, such as oranges.      •Do not smoke. Nicotine and other chemicals in cigarettes and cigars can cause mouth and lung damage. Ask your healthcare provider for information if you currently smoke and need help to quit. E-cigarettes or smokeless tobacco still contain nicotine. Talk to your healthcare provider before you use these products.       Follow up with your doctor as directed: Write down your questions so you remember to ask them during your visits.

## 2022-03-22 VITALS
SYSTOLIC BLOOD PRESSURE: 120 MMHG | HEART RATE: 87 BPM | OXYGEN SATURATION: 100 % | TEMPERATURE: 98 F | DIASTOLIC BLOOD PRESSURE: 90 MMHG | RESPIRATION RATE: 87 BRPM

## 2022-04-01 NOTE — ED PROVIDER NOTE - CLINICAL SUMMARY MEDICAL DECISION MAKING FREE TEXT BOX
7 DAY EVENT MONITOR ORDERED AND SCANNED INTO MEDIA   A: 36 y/o male with PMH of multiple prior episodes of this same chief complaint, and no other medical history, presents x7 of mouth sores. Pt reports multiple episodes of mouth sore outbreaks over the past year. Pt dose not tolerate oral exam very well. Two ulcerated, non-bleeding sores are evident to inner aspect of left side of lower lip, and inside of right cheek; both approx 1-1.5cm diameter. Pt has presented multiple time in this ED for the same complaint. Prior notes continually agree with today's assessment that the Pt does not appropriately follow up with PCP or Rheumatologist for continued care.    P: Will attempt to help control pain and continue to educate Pt that he needs to keep scheduled appointments and follow the course of care outlined by specialists. Will consider d/c to home with encouragement to follow up with PCP and rheumatologist. A: 36 y/o male with PMH of multiple prior episodes of this same chief complaint, and no other medical history, presents x7 days with mouth sores. Pt reports multiple episodes of mouth sore outbreaks over the past year. Pt dose not tolerate oral exam very well. Two ulcerated, non-bleeding sores are evident to inner aspect of left side of lower lip, and inside of right cheek; both approx 1-1.5cm diameter. Pt has presented multiple time in this ED for the same complaint. Prior notes continually agree with today's assessment that the Pt does not appropriately follow up with PCP or Rheumatologist for continued care.    P: Will attempt to help control pain and continue to educate Pt that he needs to keep scheduled appointments and follow the course of care outlined by specialists. Will consider d/c to home with encouragement to follow up with PCP and rheumatologist. A: 34 y/o male with PMH of multiple prior episodes of this same chief complaint, and no other medical history, presents x7 days with mouth sores. Pt reports multiple episodes of mouth sore outbreaks over the past year. Pt dose not tolerate oral exam very well. Two ulcerated, non-bleeding sores are evident to inner aspect of left side of lower lip, and inside of right cheek; both approx 1-1.5cm diameter. Pt has presented multiple time in this ED for the same complaint. Prior notes continually agree with today's assessment that the Pt does not appropriately follow up with PCP or Rheumatologist for continued care.    P: Will attempt to help control pain and continue to educate Pt that he needs to keep scheduled appointments and follow the course of care outlined by specialists. Will consider d/c to home with encouragement to follow up with PCP and rheumatologist.  Will rx valtrex and medrol dose elvira - pt states that is the only thing that makes symptoms bearable - discussed risks involved

## 2022-04-13 ENCOUNTER — EMERGENCY (EMERGENCY)
Facility: HOSPITAL | Age: 37
LOS: 1 days | Discharge: HOME | End: 2022-04-13
Admitting: EMERGENCY MEDICINE
Payer: MEDICAID

## 2022-04-13 VITALS
DIASTOLIC BLOOD PRESSURE: 69 MMHG | RESPIRATION RATE: 18 BRPM | HEART RATE: 78 BPM | OXYGEN SATURATION: 97 % | TEMPERATURE: 97 F | HEIGHT: 66 IN | SYSTOLIC BLOOD PRESSURE: 106 MMHG

## 2022-04-13 PROCEDURE — 99283 EMERGENCY DEPT VISIT LOW MDM: CPT

## 2022-04-13 RX ORDER — LIDOCAINE 4 G/100G
10 CREAM TOPICAL
Qty: 300 | Refills: 0
Start: 2022-04-13 | End: 2022-04-22

## 2022-04-13 RX ORDER — LIDOCAINE 4 G/100G
15 CREAM TOPICAL ONCE
Refills: 0 | Status: COMPLETED | OUTPATIENT
Start: 2022-04-13 | End: 2022-04-13

## 2022-04-13 RX ORDER — VALACYCLOVIR 500 MG/1
1000 TABLET, FILM COATED ORAL ONCE
Refills: 0 | Status: COMPLETED | OUTPATIENT
Start: 2022-04-13 | End: 2022-04-13

## 2022-04-13 RX ORDER — VALACYCLOVIR 500 MG/1
1 TABLET, FILM COATED ORAL
Qty: 14 | Refills: 0
Start: 2022-04-13 | End: 2022-04-19

## 2022-04-13 RX ADMIN — LIDOCAINE 15 MILLILITER(S): 4 CREAM TOPICAL at 23:13

## 2022-04-13 RX ADMIN — VALACYCLOVIR 1000 MILLIGRAM(S): 500 TABLET, FILM COATED ORAL at 23:18

## 2022-04-13 RX ADMIN — Medication 50 MILLIGRAM(S): at 23:13

## 2022-04-13 NOTE — ED ADULT TRIAGE NOTE - CHIEF COMPLAINT QUOTE
Pt with PMH of ETOH and herpetic gingivo stomatitis comes in with sores and pain in his mouth and difficulty in eating and drinking due to pain. Pt with PMH of ETOH and herpetic gingivo stomatitis comes in with sores and pain in his mouth and difficulty in eating and drinking due to pain. He was treated with antibiotics two weeks ago for the same.

## 2022-04-13 NOTE — ED PROVIDER NOTE - PHYSICAL EXAMINATION
HEENT: + ulceration to lower lip (midline) and 2 additional white ulcers measuring approx. 2 CM to the L side of the tongue.

## 2022-04-13 NOTE — ED PROVIDER NOTE - NSDCPRINTRESULTS_ED_ALL_ED
Patient requests all Lab, Cardiology, and Radiology Results on their Discharge Instructions Rhomboid Transposition Flap Text: The defect edges were debeveled with a #15 scalpel blade.  Given the location of the defect and the proximity to free margins a rhomboid transposition flap was deemed most appropriate.  Using a sterile surgical marker, an appropriate rhomboid flap was drawn incorporating the defect.    The area thus outlined was incised deep to adipose tissue with a #15 scalpel blade.  The skin margins were undermined to an appropriate distance in all directions utilizing iris scissors.

## 2022-04-13 NOTE — ED PROVIDER NOTE - CLINICAL SUMMARY MEDICAL DECISION MAKING FREE TEXT BOX
35 Y/O M PMH ETOH use, Possible Herpetic Gingivostomatitis vs Behcet's disease presents C/O oral ulcers for the past 3 days. Pt states he has had rashes in the past to his hands and states occasionally he will have redness in his eyes at the same time he has oral ulcers. Pt states he drinks alcohol frequently and is a smoker. Pt declines admission and declines labwork. Pt was given discharge lounge follow up. Pt was advised of the need to stop smoking and of the serous nature of Behcet'sif that is indeed his diagnosis. Pt is alert, oriented, clinically sober and displays decision making capacity, verbalized understanding of recommendations.

## 2022-04-13 NOTE — ED PROVIDER NOTE - PATIENT PORTAL LINK FT
You can access the FollowMyHealth Patient Portal offered by [facility] by registering at the following website: [email or web address]. By joining [employer]’s FollowMyHealth portal, you will also be able to view your health information using other applications (apps) compatible with our system.

## 2022-04-13 NOTE — ED PROVIDER NOTE - OBJECTIVE STATEMENT
35 Y/O F 37 Y/O M PMH ETOH use, Possible Herpetic Gingivostomatitis vs Behcet's disease presents C/O oral ulcers for the past 3 days. Pt states he has had rashes in the past to his hands and states occasionally he will have redness in his eyes at the same time he has oral ulcers. Pt states he drinks alcohol frequently and is a smoker. Pt advised he would benefit from admission for further Rheumatologic workup. Pt declines an HIV test, states he has Improved with the treatment he was discharged on previously and would not want to stay overnight or have bloodwork. Pt denies any other sx or acute complaints.

## 2022-04-13 NOTE — ED PROVIDER NOTE - NSFOLLOWUPINSTRUCTIONS_ED_ALL_ED_FT
You were offered admission for possible Behcet's Disease but declined.  Follow up with your primary doctor and attempt to stop smoking. The ER will reach out and attempt to schedule you for Rheumatology, Dermatology, Ophthalmology and infectious disease follow up. Follow up with these specialties. You were given Prednisone and Lidocaine for your ulcers. Advance activity as tolerated.  Continue all previously prescribed medications as directed.  Follow up with your primary care physician in 48-72 hours- bring copies of your results.  Return to the ER for worsening or persistent symptoms, and/or ANY NEW OR CONCERNING SYMPTOMS. If you have issues obtaining follow up, please call: [phone] to obtain a doctor or specialist who takes your insurance in your area.  You may call [phone] to make an appointment with the internal medicine clinic.

## 2022-04-13 NOTE — ED PROVIDER NOTE - NSPTACCESSSVCSAPPTDETAILS_ED_ALL_ED_FT
Rheumatology, Dermatology and Infectious disease for possible Bachet's Disease. Rheumatology, Dermatology, Ophthalmology and Infectious disease for possible Behcet's Disease.

## 2022-04-14 PROBLEM — B00.2 HERPESVIRAL GINGIVOSTOMATITIS AND PHARYNGOTONSILLITIS: Chronic | Status: ACTIVE | Noted: 2022-03-20

## 2022-06-11 ENCOUNTER — EMERGENCY (EMERGENCY)
Facility: HOSPITAL | Age: 37
LOS: 1 days | Discharge: HOME | End: 2022-06-11
Attending: STUDENT IN AN ORGANIZED HEALTH CARE EDUCATION/TRAINING PROGRAM | Admitting: STUDENT IN AN ORGANIZED HEALTH CARE EDUCATION/TRAINING PROGRAM
Payer: MEDICAID

## 2022-06-11 VITALS
OXYGEN SATURATION: 100 % | HEART RATE: 83 BPM | RESPIRATION RATE: 15 BRPM | DIASTOLIC BLOOD PRESSURE: 77 MMHG | SYSTOLIC BLOOD PRESSURE: 130 MMHG | TEMPERATURE: 98 F

## 2022-06-11 VITALS
SYSTOLIC BLOOD PRESSURE: 117 MMHG | TEMPERATURE: 97 F | HEART RATE: 88 BPM | OXYGEN SATURATION: 100 % | RESPIRATION RATE: 12 BRPM | HEIGHT: 66 IN | DIASTOLIC BLOOD PRESSURE: 80 MMHG

## 2022-06-11 LAB
ALBUMIN SERPL ELPH-MCNC: 5.4 G/DL — HIGH (ref 3.3–5)
ALP SERPL-CCNC: 87 U/L — SIGNIFICANT CHANGE UP (ref 40–120)
ALT FLD-CCNC: 92 U/L — HIGH (ref 4–41)
ANION GAP SERPL CALC-SCNC: 21 MMOL/L — HIGH (ref 7–14)
AST SERPL-CCNC: 117 U/L — HIGH (ref 4–40)
BASOPHILS # BLD AUTO: 0.04 K/UL — SIGNIFICANT CHANGE UP (ref 0–0.2)
BASOPHILS NFR BLD AUTO: 0.6 % — SIGNIFICANT CHANGE UP (ref 0–2)
BILIRUB SERPL-MCNC: 1.1 MG/DL — SIGNIFICANT CHANGE UP (ref 0.2–1.2)
BUN SERPL-MCNC: 8 MG/DL — SIGNIFICANT CHANGE UP (ref 7–23)
CALCIUM SERPL-MCNC: 10 MG/DL — SIGNIFICANT CHANGE UP (ref 8.4–10.5)
CHLORIDE SERPL-SCNC: 91 MMOL/L — LOW (ref 98–107)
CO2 SERPL-SCNC: 23 MMOL/L — SIGNIFICANT CHANGE UP (ref 22–31)
CREAT SERPL-MCNC: 0.66 MG/DL — SIGNIFICANT CHANGE UP (ref 0.5–1.3)
EGFR: 123 ML/MIN/1.73M2 — SIGNIFICANT CHANGE UP
EOSINOPHIL # BLD AUTO: 0.1 K/UL — SIGNIFICANT CHANGE UP (ref 0–0.5)
EOSINOPHIL NFR BLD AUTO: 1.5 % — SIGNIFICANT CHANGE UP (ref 0–6)
GLUCOSE SERPL-MCNC: 75 MG/DL — SIGNIFICANT CHANGE UP (ref 70–99)
HCT VFR BLD CALC: 45.8 % — SIGNIFICANT CHANGE UP (ref 39–50)
HGB BLD-MCNC: 16 G/DL — SIGNIFICANT CHANGE UP (ref 13–17)
IANC: 4.51 K/UL — SIGNIFICANT CHANGE UP (ref 1.8–7.4)
IMM GRANULOCYTES NFR BLD AUTO: 0.1 % — SIGNIFICANT CHANGE UP (ref 0–1.5)
LYMPHOCYTES # BLD AUTO: 1.31 K/UL — SIGNIFICANT CHANGE UP (ref 1–3.3)
LYMPHOCYTES # BLD AUTO: 19.2 % — SIGNIFICANT CHANGE UP (ref 13–44)
MCHC RBC-ENTMCNC: 32 PG — SIGNIFICANT CHANGE UP (ref 27–34)
MCHC RBC-ENTMCNC: 34.9 GM/DL — SIGNIFICANT CHANGE UP (ref 32–36)
MCV RBC AUTO: 91.6 FL — SIGNIFICANT CHANGE UP (ref 80–100)
MONOCYTES # BLD AUTO: 0.87 K/UL — SIGNIFICANT CHANGE UP (ref 0–0.9)
MONOCYTES NFR BLD AUTO: 12.7 % — SIGNIFICANT CHANGE UP (ref 2–14)
NEUTROPHILS # BLD AUTO: 4.51 K/UL — SIGNIFICANT CHANGE UP (ref 1.8–7.4)
NEUTROPHILS NFR BLD AUTO: 65.9 % — SIGNIFICANT CHANGE UP (ref 43–77)
NRBC # BLD: 0 /100 WBCS — SIGNIFICANT CHANGE UP
NRBC # FLD: 0 K/UL — SIGNIFICANT CHANGE UP
PLATELET # BLD AUTO: 264 K/UL — SIGNIFICANT CHANGE UP (ref 150–400)
POTASSIUM SERPL-MCNC: 5.3 MMOL/L — SIGNIFICANT CHANGE UP (ref 3.5–5.3)
POTASSIUM SERPL-SCNC: 5.3 MMOL/L — SIGNIFICANT CHANGE UP (ref 3.5–5.3)
PROT SERPL-MCNC: 8.8 G/DL — HIGH (ref 6–8.3)
RBC # BLD: 5 M/UL — SIGNIFICANT CHANGE UP (ref 4.2–5.8)
RBC # FLD: 13.3 % — SIGNIFICANT CHANGE UP (ref 10.3–14.5)
SODIUM SERPL-SCNC: 135 MMOL/L — SIGNIFICANT CHANGE UP (ref 135–145)
WBC # BLD: 6.84 K/UL — SIGNIFICANT CHANGE UP (ref 3.8–10.5)
WBC # FLD AUTO: 6.84 K/UL — SIGNIFICANT CHANGE UP (ref 3.8–10.5)

## 2022-06-11 PROCEDURE — 99284 EMERGENCY DEPT VISIT MOD MDM: CPT

## 2022-06-11 RX ORDER — DEXAMETHASONE 0.5 MG/5ML
12 ELIXIR ORAL ONCE
Refills: 0 | Status: DISCONTINUED | OUTPATIENT
Start: 2022-06-11 | End: 2022-06-11

## 2022-06-11 RX ORDER — LIDOCAINE 4 G/100G
5 CREAM TOPICAL
Qty: 70 | Refills: 0
Start: 2022-06-11 | End: 2022-06-17

## 2022-06-11 RX ORDER — VALACYCLOVIR 500 MG/1
1000 TABLET, FILM COATED ORAL ONCE
Refills: 0 | Status: COMPLETED | OUTPATIENT
Start: 2022-06-11 | End: 2022-06-11

## 2022-06-11 RX ORDER — PYRIDOXINE HCL (VITAMIN B6) 100 MG
50 TABLET ORAL ONCE
Refills: 0 | Status: COMPLETED | OUTPATIENT
Start: 2022-06-11 | End: 2022-06-11

## 2022-06-11 RX ORDER — NIACIN 50 MG
500 TABLET ORAL ONCE
Refills: 0 | Status: COMPLETED | OUTPATIENT
Start: 2022-06-11 | End: 2022-06-11

## 2022-06-11 RX ORDER — DIPHENHYDRAMINE HYDROCHLORIDE AND LIDOCAINE HYDROCHLORIDE AND ALUMINUM HYDROXIDE AND MAGNESIUM HYDRO
30 KIT ONCE
Refills: 0 | Status: COMPLETED | OUTPATIENT
Start: 2022-06-11 | End: 2022-06-11

## 2022-06-11 RX ORDER — SODIUM CHLORIDE 9 MG/ML
1000 INJECTION, SOLUTION INTRAVENOUS
Refills: 0 | Status: COMPLETED | OUTPATIENT
Start: 2022-06-11 | End: 2022-06-11

## 2022-06-11 RX ORDER — LIDOCAINE 4 G/100G
10 CREAM TOPICAL ONCE
Refills: 0 | Status: COMPLETED | OUTPATIENT
Start: 2022-06-11 | End: 2022-06-11

## 2022-06-11 RX ORDER — DEXAMETHASONE 0.5 MG/5ML
10 ELIXIR ORAL ONCE
Refills: 0 | Status: COMPLETED | OUTPATIENT
Start: 2022-06-11 | End: 2022-06-11

## 2022-06-11 RX ORDER — DIPHENHYDRAMINE HYDROCHLORIDE AND LIDOCAINE HYDROCHLORIDE AND ALUMINUM HYDROXIDE AND MAGNESIUM HYDRO
10 KIT
Qty: 200 | Refills: 0
Start: 2022-06-11 | End: 2022-06-20

## 2022-06-11 RX ORDER — VALACYCLOVIR 500 MG/1
1 TABLET, FILM COATED ORAL
Qty: 21 | Refills: 0
Start: 2022-06-11 | End: 2022-06-17

## 2022-06-11 RX ORDER — SODIUM CHLORIDE 9 MG/ML
1000 INJECTION, SOLUTION INTRAVENOUS
Refills: 0 | Status: DISCONTINUED | OUTPATIENT
Start: 2022-06-11 | End: 2022-06-11

## 2022-06-11 RX ADMIN — Medication 500 MILLIGRAM(S): at 17:49

## 2022-06-11 RX ADMIN — Medication 10 MILLIGRAM(S): at 19:00

## 2022-06-11 RX ADMIN — Medication 25 MILLIGRAM(S): at 17:49

## 2022-06-11 RX ADMIN — DIPHENHYDRAMINE HYDROCHLORIDE AND LIDOCAINE HYDROCHLORIDE AND ALUMINUM HYDROXIDE AND MAGNESIUM HYDRO 30 MILLILITER(S): KIT at 17:47

## 2022-06-11 RX ADMIN — Medication 102 MILLIGRAM(S): at 17:58

## 2022-06-11 RX ADMIN — LIDOCAINE 10 MILLILITER(S): 4 CREAM TOPICAL at 17:47

## 2022-06-11 RX ADMIN — Medication 50 MILLIGRAM(S): at 17:51

## 2022-06-11 RX ADMIN — VALACYCLOVIR 1000 MILLIGRAM(S): 500 TABLET, FILM COATED ORAL at 20:32

## 2022-06-11 RX ADMIN — SODIUM CHLORIDE 1000 MILLILITER(S): 9 INJECTION, SOLUTION INTRAVENOUS at 18:51

## 2022-06-11 NOTE — ED PROVIDER NOTE - PHYSICAL EXAMINATION
CONSTITUTIONAL: Well-appearing; well-nourished; in no apparent distress. Non-toxic appearing.   NEURO: Alert & oriented. Gait steady without assistance. Sensory and motor functions are grossly intact.  PSYCH: Mood appropriate. Thought processes intact.   MOUTH: Lips are dry and cracked with multiple scattered ulcerations to the lower lip mucosa and throughout buccal mucosa, unable to visualized posterior pharynx/tonsils. No drooling. Floor of mouth erythematous without lesions.   NECK: Supple. (+) non-tender anterior cervical lymphadenopathy.   CARD: Regular rate and rhythm, no murmurs  RESP: No accessory muscle use; breath sounds clear and equal bilaterally; no wheezes, rhonchi, or rales     MUSCULOSKELETAL/EXTREMITIES: FROM in all four extremities  SKIN: to the palms of the hands there are scattered flat erythematous macules < .5 cm in size. Otherwise skin is warm; dry; no apparent  exudates, vesicles, or poxs.  No lesions noted to the soles of the feet.

## 2022-06-11 NOTE — ED ADULT TRIAGE NOTE - CHIEF COMPLAINT QUOTE
pt c/o mouth sore pain, sores coming back. pt noted BL hand rash under skin, red spots noted. denies fever. pt treated for oral herpes in April

## 2022-06-11 NOTE — ED PROVIDER NOTE - ATTENDING APP SHARED VISIT CONTRIBUTION OF CARE
I (Becca) agree with above, I performed a history and physical. Counseled bird medical staff, physician assistant, and/or medical student on medical decision making as documented. Medical decisions and treatment interventions were made in real time during the patient encounter. Additionally and/or with the following exceptions: The patient presented to the ED with stomatitis which has been chronic, suspect vitamin deficiency, vitamin b levels sent, informed patient there will be A delay in report of these values. Repleted vitamins in the ed. Return precautions reviewed. Patient verbalized understand of conditions for return and plan for follow up. Patient was instructed to utilize [phone] to obtain follow up as indicated.

## 2022-06-11 NOTE — ED PROVIDER NOTE - OBJECTIVE STATEMENT
37 y/o male with hx of EToH abuse and chronic oral lesions presents c/o flare up of oral mouth sores x 1 week that becomes worse when he drinks. Pt denies heavy alcohol use recently. He also c/o lesions on his hands "underneath the skin" since yesterday. He notes he's been having these lesions reoccurring x 2 years and they occur ~ q6 months. He feels like sometimes his throat is closing because of the sores. Pt has not followed up outside of ED for these lesions. Denies recent URI, fever, or chills. No

## 2022-06-11 NOTE — ED PROVIDER NOTE - PATIENT PORTAL LINK FT
You can access the FollowMyHealth Patient Portal offered by [facility] by registering at the following website: [email or web address]. By joining [employer]’s FollowMyHealth portal, you will also be able to view your health information using other applications (apps) compatible with our system.

## 2022-06-11 NOTE — ED PROVIDER NOTE - CLINICAL SUMMARY MEDICAL DECISION MAKING FREE TEXT BOX
35 y/o male with hx of EToH abuse and chronic oral lesions presents c/o flare up of oral mouth sores x 1 week that becomes worse when he drinks. H&P most c/w pellagra. Will replace vitamins and provide symptomatic care.

## 2022-06-11 NOTE — ED PROVIDER NOTE - NS ED ROS FT
ROS:  GENERAL: No fever, no chills  HEENT: As per HPI   CARDIAC: no chest pain  PULMONARY: no cough, no shortness of breath  GI: no abdominal pain, no nausea, no vomiting, no diarrhea, no constipation  : No dysuria, no frequency, no change in appearance or odor of urine  SKIN: no rashes  NEURO: no headache, no weakness, no dizziness  MSK: No joint pain  All other systems reviewed as negative.

## 2022-06-12 LAB — VIT B12 SERPL-MCNC: 806 PG/ML — SIGNIFICANT CHANGE UP (ref 200–900)

## 2022-06-19 LAB
PANTOTHENATE SERPLBLD-MCNC: 128.2 NG/ML — SIGNIFICANT CHANGE UP (ref 12.9–253.1)
VIT A SERPL-MCNC: 13.7 UG/DL — LOW (ref 18.9–57.3)

## 2022-06-30 LAB — PYRIDOXAL PHOS SERPL-MCNC: 20.6 UG/L — SIGNIFICANT CHANGE UP (ref 3.4–65.2)

## 2022-08-21 ENCOUNTER — EMERGENCY (EMERGENCY)
Facility: HOSPITAL | Age: 37
LOS: 1 days | Discharge: HOME | End: 2022-08-21
Attending: STUDENT IN AN ORGANIZED HEALTH CARE EDUCATION/TRAINING PROGRAM | Admitting: STUDENT IN AN ORGANIZED HEALTH CARE EDUCATION/TRAINING PROGRAM

## 2022-08-21 VITALS
RESPIRATION RATE: 16 BRPM | TEMPERATURE: 98 F | DIASTOLIC BLOOD PRESSURE: 83 MMHG | OXYGEN SATURATION: 99 % | SYSTOLIC BLOOD PRESSURE: 125 MMHG | HEART RATE: 116 BPM

## 2022-08-21 PROCEDURE — 12011 RPR F/E/E/N/L/M 2.5 CM/<: CPT

## 2022-08-21 PROCEDURE — 99285 EMERGENCY DEPT VISIT HI MDM: CPT | Mod: 25

## 2022-08-21 RX ORDER — ACETAMINOPHEN 500 MG
650 TABLET ORAL ONCE
Refills: 0 | Status: COMPLETED | OUTPATIENT
Start: 2022-08-21 | End: 2022-08-21

## 2022-08-21 RX ORDER — HALOPERIDOL DECANOATE 100 MG/ML
5 INJECTION INTRAMUSCULAR ONCE
Refills: 0 | Status: COMPLETED | OUTPATIENT
Start: 2022-08-21 | End: 2022-08-21

## 2022-08-21 RX ORDER — DIPHENHYDRAMINE HCL 50 MG
50 CAPSULE ORAL ONCE
Refills: 0 | Status: COMPLETED | OUTPATIENT
Start: 2022-08-21 | End: 2022-08-21

## 2022-08-21 RX ADMIN — Medication 650 MILLIGRAM(S): at 23:10

## 2022-08-21 RX ADMIN — HALOPERIDOL DECANOATE 5 MILLIGRAM(S): 100 INJECTION INTRAMUSCULAR at 22:40

## 2022-08-21 RX ADMIN — Medication 50 MILLIGRAM(S): at 22:53

## 2022-08-21 RX ADMIN — Medication 2 MILLIGRAM(S): at 22:40
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
## 2022-08-21 NOTE — ED ADULT NURSE NOTE - NSIMPLEMENTINTERV_GEN_ALL_ED
Implemented All Fall Risk Interventions:  [place] to call system. Call bell, personal items and telephone within reach. Instruct patient to call for assistance. Room bathroom lighting operational. Non-slip footwear when patient is off stretcher. Physically safe environment: no spills, clutter or unnecessary equipment. Stretcher in lowest position, wheels locked, appropriate side rails in place. Provide visual cue, wrist band, yellow gown, etc. Monitor gait and stability. Monitor for mental status changes and reorient to person, place, and time. Review medications for side effects contributing to fall risk. Reinforce activity limits and safety measures with patient and family.
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
## 2022-08-21 NOTE — ED PROVIDER NOTE - PATIENT PORTAL LINK FT
You can access the FollowMyHealth Patient Portal offered by [facility] by registering at the following website: [email or web address]. By joining [employer]’s FollowMyHealth portal, you will also be able to view your health information using other applications (apps) compatible with our system. You can access the FollowMyHealth Patient Portal offered by [facility] by registering at the following website: [email or web address]. By joining [employer]’s FollowMyHealth portal, you will also be able to view your health information using other applications (apps) compatible with our system.

## 2022-08-21 NOTE — ED PROVIDER NOTE - NSFOLLOWUPCLINICS_GEN_ALL_ED_FT
[facility]  Internal Medicine  [address] [place] [address]  Phone: [phone]  Fax: [phone]

## 2022-08-21 NOTE — ED ADULT TRIAGE NOTE - PAIN RATING/NUMBER SCALE (0-10): REST
Physical Therapy  Room #: IC02/IC02-01  Date: 3/1/2022       Patient Name: Carmen Walsh  : 1949      MRN: [id number]     Patient unavailable for physical therapy eval due to unavailable/not appropriate per nursing due to increased confusion levels, agitation, and ativan provided this morning. . Will attempt PT evaluation at a later time. Thank you.        Gabby Toure, Student Physical Therapist 0
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
## 2022-08-21 NOTE — ED PROVIDER NOTE - PLAN OF CARE
Patient wants to leave, still having mild tongue fasciculations and tremors on exam. Etoh level 3x the legal limit. Will give Librium 25mg. Informed of LFT elevations. Xray of L humerus w no fx, CTH WNL.

## 2022-08-22 VITALS
HEART RATE: 67 BPM | SYSTOLIC BLOOD PRESSURE: 109 MMHG | RESPIRATION RATE: 17 BRPM | TEMPERATURE: 98 F | DIASTOLIC BLOOD PRESSURE: 66 MMHG | OXYGEN SATURATION: 99 %

## 2022-08-22 LAB
ALBUMIN SERPL ELPH-MCNC: 4.9 G/DL — SIGNIFICANT CHANGE UP (ref 3.3–5)
ALP SERPL-CCNC: 81 U/L — SIGNIFICANT CHANGE UP (ref 40–120)
ALT FLD-CCNC: 54 U/L — HIGH (ref 4–41)
ANION GAP SERPL CALC-SCNC: 19 MMOL/L — HIGH (ref 7–14)
AST SERPL-CCNC: 112 U/L — HIGH (ref 4–40)
BASOPHILS # BLD AUTO: 0.07 K/UL — SIGNIFICANT CHANGE UP (ref 0–0.2)
BASOPHILS NFR BLD AUTO: 0.7 % — SIGNIFICANT CHANGE UP (ref 0–2)
BILIRUB SERPL-MCNC: 0.6 MG/DL — SIGNIFICANT CHANGE UP (ref 0.2–1.2)
BUN SERPL-MCNC: 11 MG/DL — SIGNIFICANT CHANGE UP (ref 7–23)
CALCIUM SERPL-MCNC: 9.2 MG/DL — SIGNIFICANT CHANGE UP (ref 8.4–10.5)
CHLORIDE SERPL-SCNC: 101 MMOL/L — SIGNIFICANT CHANGE UP (ref 98–107)
CO2 SERPL-SCNC: 22 MMOL/L — SIGNIFICANT CHANGE UP (ref 22–31)
CREAT SERPL-MCNC: 0.69 MG/DL — SIGNIFICANT CHANGE UP (ref 0.5–1.3)
EGFR: 121 ML/MIN/1.73M2 — SIGNIFICANT CHANGE UP
EOSINOPHIL # BLD AUTO: 0.02 K/UL — SIGNIFICANT CHANGE UP (ref 0–0.5)
EOSINOPHIL NFR BLD AUTO: 0.2 % — SIGNIFICANT CHANGE UP (ref 0–6)
ETHANOL SERPL-MCNC: 218 MG/DL — HIGH
GLUCOSE SERPL-MCNC: 141 MG/DL — HIGH (ref 70–99)
HCT VFR BLD CALC: 43.7 % — SIGNIFICANT CHANGE UP (ref 39–50)
HGB BLD-MCNC: 15 G/DL — SIGNIFICANT CHANGE UP (ref 13–17)
IANC: 8.21 K/UL — HIGH (ref 1.8–7.4)
IMM GRANULOCYTES NFR BLD AUTO: 0.3 % — SIGNIFICANT CHANGE UP (ref 0–1.5)
LIDOCAIN IGE QN: 29 U/L — SIGNIFICANT CHANGE UP (ref 7–60)
LYMPHOCYTES # BLD AUTO: 1.14 K/UL — SIGNIFICANT CHANGE UP (ref 1–3.3)
LYMPHOCYTES # BLD AUTO: 11.5 % — LOW (ref 13–44)
MCHC RBC-ENTMCNC: 32.1 PG — SIGNIFICANT CHANGE UP (ref 27–34)
MCHC RBC-ENTMCNC: 34.3 GM/DL — SIGNIFICANT CHANGE UP (ref 32–36)
MCV RBC AUTO: 93.4 FL — SIGNIFICANT CHANGE UP (ref 80–100)
MONOCYTES # BLD AUTO: 0.44 K/UL — SIGNIFICANT CHANGE UP (ref 0–0.9)
MONOCYTES NFR BLD AUTO: 4.4 % — SIGNIFICANT CHANGE UP (ref 2–14)
NEUTROPHILS # BLD AUTO: 8.21 K/UL — HIGH (ref 1.8–7.4)
NEUTROPHILS NFR BLD AUTO: 82.9 % — HIGH (ref 43–77)
NRBC # BLD: 0 /100 WBCS — SIGNIFICANT CHANGE UP (ref 0–0)
NRBC # FLD: 0 K/UL — SIGNIFICANT CHANGE UP (ref 0–0)
PLATELET # BLD AUTO: 193 K/UL — SIGNIFICANT CHANGE UP (ref 150–400)
POTASSIUM SERPL-MCNC: 4 MMOL/L — SIGNIFICANT CHANGE UP (ref 3.5–5.3)
POTASSIUM SERPL-SCNC: 4 MMOL/L — SIGNIFICANT CHANGE UP (ref 3.5–5.3)
PROT SERPL-MCNC: 7.5 G/DL — SIGNIFICANT CHANGE UP (ref 6–8.3)
RBC # BLD: 4.68 M/UL — SIGNIFICANT CHANGE UP (ref 4.2–5.8)
RBC # FLD: 14.1 % — SIGNIFICANT CHANGE UP (ref 10.3–14.5)
SODIUM SERPL-SCNC: 142 MMOL/L — SIGNIFICANT CHANGE UP (ref 135–145)
WBC # BLD: 9.91 K/UL — SIGNIFICANT CHANGE UP (ref 3.8–10.5)
WBC # FLD AUTO: 9.91 K/UL — SIGNIFICANT CHANGE UP (ref 3.8–10.5)

## 2022-08-22 PROCEDURE — 70450 CT HEAD/BRAIN W/O DYE: CPT | Mod: 26,MF

## 2022-08-22 PROCEDURE — 73060 X-RAY EXAM OF HUMERUS: CPT | Mod: 26,RT,76

## 2022-08-22 PROCEDURE — 71045 X-RAY EXAM CHEST 1 VIEW: CPT | Mod: 26

## 2022-08-22 PROCEDURE — 70486 CT MAXILLOFACIAL W/O DYE: CPT | Mod: 26,MG

## 2022-08-22 PROCEDURE — 73080 X-RAY EXAM OF ELBOW: CPT | Mod: 26,LT

## 2022-08-22 PROCEDURE — G1004: CPT

## 2022-08-22 PROCEDURE — 73030 X-RAY EXAM OF SHOULDER: CPT | Mod: 26,LT

## 2022-08-22 RX ORDER — MIDAZOLAM HYDROCHLORIDE 1 MG/ML
4 INJECTION, SOLUTION INTRAMUSCULAR; INTRAVENOUS ONCE
Refills: 0 | Status: DISCONTINUED | OUTPATIENT
Start: 2022-08-22 | End: 2022-08-22

## 2022-08-22 RX ORDER — KETOROLAC TROMETHAMINE 30 MG/ML
15 SYRINGE (ML) INJECTION ONCE
Refills: 0 | Status: DISCONTINUED | OUTPATIENT
Start: 2022-08-22 | End: 2022-08-22

## 2022-08-22 RX ORDER — SODIUM CHLORIDE 9 MG/ML
1000 INJECTION INTRAMUSCULAR; INTRAVENOUS; SUBCUTANEOUS ONCE
Refills: 0 | Status: COMPLETED | OUTPATIENT
Start: 2022-08-22 | End: 2022-08-22

## 2022-08-22 RX ADMIN — Medication 15 MILLIGRAM(S): at 10:32

## 2022-08-22 RX ADMIN — SODIUM CHLORIDE 1000 MILLILITER(S): 9 INJECTION INTRAMUSCULAR; INTRAVENOUS; SUBCUTANEOUS at 10:33

## 2022-08-22 RX ADMIN — Medication 25 MILLIGRAM(S): at 14:24

## 2022-08-22 NOTE — ED ADULT NURSE REASSESSMENT NOTE - NS ED NURSE REASSESS COMMENT FT1
SBIRt called and notified to come speak with pt, pt in NAD, calm and cooperative, provided breakfast, will continue to monitor.

## 2022-12-30 NOTE — ED PROVIDER NOTE - SKIN [+], MLM
Regarding: [place], Allergic reaction. Breathing is ok. Should she take benadryl with metoprolol?  ----- Message from Sukhdev Rivas sent at 12/29/2022 10:42 PM CST -----  Patient Name: Nupur Young    Full Name of Provider seen for current symptoms: Dr. Ethan Stark, DO    Symptoms: Allergic reaction. Breathing is ok. Should she take benadryl with metoprolol? Over a week has been getting hives/welts ion legs and by eyes, took benadryl and hives went away, on levothyroxine and metoprolol, hives came back right after she took her metoprolol.  Should she stop metoprolol?    Pregnant (females aged 13-60. If Yes, how long?) NO    Call Back #:[phone] (Mobile)    Clinic Site / Call Center Account # for provider seen for current symptoms:[id number]    Which State are you currently located in? (enter State name in Summary field): [place]    Patients needing callback from the RN are informed of the following:   Please be aware the return phone call may come from an unidentified phone number or out of state phone number. Also keep in mind that call back times vary based on call volumes.  If your condition becomes life threatening while you wait for a callback, you should seek immediate medical assistance by calling 911 or going to the Emergency Department for evaluation.     RASH/Arms bilaterally

## 2023-02-07 NOTE — ED PROVIDER NOTE - CLINICAL SUMMARY MEDICAL DECISION MAKING FREE TEXT BOX
8482 See Neuro assessment sheet and dysphagia screen tool. Pt awaiting bed. A&Ox4. 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

## 2023-06-15 ENCOUNTER — EMERGENCY (EMERGENCY)
Facility: HOSPITAL | Age: 38
LOS: 1 days | Discharge: HOME | End: 2023-06-15
Attending: EMERGENCY MEDICINE | Admitting: EMERGENCY MEDICINE
Payer: MEDICAID

## 2023-06-15 VITALS
TEMPERATURE: 98 F | HEART RATE: 100 BPM | OXYGEN SATURATION: 100 % | DIASTOLIC BLOOD PRESSURE: 74 MMHG | RESPIRATION RATE: 18 BRPM | SYSTOLIC BLOOD PRESSURE: 119 MMHG

## 2023-06-15 PROCEDURE — 99284 EMERGENCY DEPT VISIT MOD MDM: CPT
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
## 2023-06-15 NOTE — ED PROVIDER NOTE - ATTENDING CONTRIBUTION TO CARE
history and physical above obtained and documented (or dictated) by me (attending physician).  Resident or ACP and/or medical student involved in case for assistance with disposition and may document involvement as necessary via progress note(s).   -Dr. Denise

## 2023-06-15 NOTE — ED PROVIDER NOTE - PATIENT PORTAL LINK FT
You can access the FollowMyHealth Patient Portal offered by [facility] by registering at the following website: [email or web address]. By joining [facility]’s FollowMyHealth portal, you will also be able to view your health information using other applications (apps) compatible with our system.

## 2023-06-15 NOTE — ED PROVIDER NOTE - PROGRESS NOTE DETAILS
Ulises, PGY2 - Patient had initially been agreeable to p.o. and stay in the ER for couple of hours, however started becoming more agitated saying that he wanted to go and what were we going to do to stop him if he decided to walk out. Due to failure of verbal de-escalation, the decision was made to give Haldol and Ativan, security was called.  Patient then calmed down, states that he is willing to stay for further observation. will not medicate at this time, will continue observing patient until clinically sober Ulises, PGY2 - patient becoming agitated again, demanding to leave and stating we were holding him against his will. haldol and ativan given. will continue assessing Ulises, PGY2 - patient sleeping comfortably. in no distress Ulises, PGY2 - patient awake and ready to go home. clinically sober. Patient stable for discharge. Understands the Emergency Room work-up and discharge precautions. Will follow-up with pcp. States he will go to his grandmothers home and get ready for work.

## 2023-06-15 NOTE — ED ADULT NURSE NOTE - CHIEF COMPLAINT QUOTE
BIBEMS for intox, had half pint of ale, 5 beers, and weed, last drink two hours ago, denies fall, no apparent injury noted. Denies pmhx, denies past hospitalization for withdrawal. Pt undressed belonging placed across room 21

## 2023-06-15 NOTE — ED ADULT TRIAGE NOTE - CHIEF COMPLAINT QUOTE
BIBEMS for intox, had half pint of ale, 5 beers, and weed, last drink two hours ago, denies fall, no apparent injury noted. Denies pmhx, denies past hospitalization for withdrawal. BIBEMS for intox, had half pint of ale, 5 beers, and weed, last drink two hours ago, denies fall, no apparent injury noted. Denies pmhx, denies past hospitalization for withdrawal. Pt undressed belonging placed across room 21 BIBEMS for intox, had half pint of ale, 5 beers, and weed, last drink two hours ago, denies fall, no apparent injury noted. Denies pmhx, denies past hospitalization for withdrawal. Pt undressed by EDT belonging placed across room 21
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
## 2023-06-15 NOTE — ED PROVIDER NOTE - PHYSICAL EXAMINATION
Gen: Alert, slurred speech, smells of alcohol, clinically inebriated  Head: NC, AT,  EOMI, normal lids/ injected conjunctiva bilaterally  ENT:  normal hearing, patent oropharynx without erythema/exudate  Neck: +supple, no tenderness/meningismus/JVD, +Trachea midline  Chest: no chest wall tenderness, equal chest rise  Pulm: Bilateral BS, normal resp effort, no wheeze/stridor/retractions  CV: RRR, no M/R/G, +dist pulses  Abd: +BS, soft, NT/ND  Rectal: deferred  Mskel: no edema/erythema/cyanosis  Skin: no rash

## 2023-06-15 NOTE — ED PROVIDER NOTE - NSFOLLOWUPINSTRUCTIONS_ED_ALL_ED_FT
You were seen in the Emergency Room today.   Please follow-up with your Primary Care Physician within the week for further monitoring.     Contact a health care provider if:  •Your symptoms get worse instead of better.  •You cannot eat or drink without vomiting.  •You are struggling with not drinking alcohol.  •You cannot stop drinking alcohol.    Please return to the Emergency Room if:  •You have an irregular heartbeat.  •You have chest pain.  •You have trouble breathing.  •You have a seizure for the first time.  •You hallucinate.  •You become very confused.
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
## 2023-06-15 NOTE — ED ADULT NURSE NOTE - NSFALLRISKINTERV_ED_ALL_ED
Assistance OOB with selected safe patient handling equipment if applicable/Assistance with ambulation/Communicate fall risk and risk factors to all staff, patient, and family/Monitor gait and stability/Monitor for mental status changes and reorient to person, place, and time, as needed/Provide visual cue: yellow wristband, yellow gown, etc/Reinforce activity limits and safety measures with patient and family/Toileting schedule using arm’s reach rule for commode and bathroom/Use of alarms - bed, stretcher, chair and/or video monitoring/Call bell, personal items and telephone in reach/Instruct patient to call for assistance before getting out of bed/chair/stretcher/Non-slip footwear applied when patient is off stretcher/Brandon to call system/Physically safe environment - no spills, clutter or unnecessary equipment/Purposeful Proactive Rounding/Room/bathroom lighting operational, light cord in reach

## 2023-06-16 VITALS
RESPIRATION RATE: 16 BRPM | HEART RATE: 95 BPM | DIASTOLIC BLOOD PRESSURE: 94 MMHG | TEMPERATURE: 98 F | OXYGEN SATURATION: 98 % | SYSTOLIC BLOOD PRESSURE: 133 MMHG

## 2023-06-16 RX ORDER — HALOPERIDOL DECANOATE 100 MG/ML
5 INJECTION INTRAMUSCULAR ONCE
Refills: 0 | Status: COMPLETED | OUTPATIENT
Start: 2023-06-16 | End: 2023-06-16

## 2023-06-16 RX ADMIN — Medication 2 MILLIGRAM(S): at 02:19

## 2023-06-16 RX ADMIN — HALOPERIDOL DECANOATE 5 MILLIGRAM(S): 100 INJECTION INTRAMUSCULAR at 02:19

## 2023-06-16 NOTE — ED ADULT NURSE REASSESSMENT NOTE - NS ED NURSE REASSESS COMMENT FT1
pt agitated and not cooperating at this time, "I need to go home." pt refusing to sit in stretcher. orders for MTF. MD Cruz made aware. security was called, orders for 5 haldol and 2 ativan orders. pt compliant when medications presented. pt laying in stretcher at this time. awaiting MTF. food and drinks given. safety maintained, side rails up
pt agitated and not cooperating with staff at this time. MD Cruz made aware. security called at this time. medicated as per MD orders. pt continues to yell in stretcher at this time. MARY Stone and security at bedside. awaiting MTF. safety maintained, side rails up.
pt resting/ calm in stretcher at this time. respirations even and unlabored. VS as noted in flowsheet. awaiting MTF. comfort measures provided. safety maintained, side rails up

## 2023-07-27 NOTE — H&P PST ADULT - WHEN FALL OCCURRED
A user error has taken place: encounter opened in error, closed for administrative reasons. last six months

## 2023-09-21 NOTE — H&P ADULT - DOES THIS PATIENT HAVE A HISTORY OF OR HAS BEEN DX WITH HEART FAILURE?
[facility] - Observation 11H  Discharge Final Note    Primary Care Provider: Colleen Mondragon MD    Expected Discharge Date: 9/20/2023    Patient discharged to home via ambulance transportation.     Patient's bedside nurse and patient notified of the above.      Final Discharge Note (most recent)       Final Note - 09/21/23 1446          Final Note    Assessment Type Final Discharge Note (P)      Anticipated Discharge Disposition Home-Health Care Svc (P)         Post-Acute Status    Post-Acute Authorization Home Health (P)      Home Health Status Referrals Sent (P)                      Important Message from Medicare             Contact Info       Colleen Mondragon MD   Specialty: Internal Medicine   Relationship: PCP - General    [address]  [address]  [place] [address]   Phone: [phone]       Next Steps: Follow up in 1 week(s)            Future Appointments   Date Time Provider Department Center   9/28/2023  8:00 AM Alena Solorio MD [facility] scheduled post-discharge follow-up appointment and information added to AVS.     Patient was originally accepted with [facility]; however, they reported when they went to submit for authorization it showed that patient was current with [facility]. SW contacted [facility] and was informed that they never admitted patient and was going to rescind their authorization. SW contacted [facility] back to explain the situation and was informed that they would no longer be able to accept patient due to scheduling conflict.    POLLY is following up with additional home health referrals that were sent to see if there is an agency willing to accept patient.    Cate Payton LMSW  [facility]  Ext. [phone]   no

## 2024-06-07 NOTE — ED ADULT TRIAGE NOTE - RESPIRATORY RATE (BREATHS/MIN)
Physical Therapy    Visit Type: treatment  SUBJECTIVE  Patient agreed to participate in therapy this date.  Patient / Family Goal: return to previous functional status, maximize function and return home    Pain   Patient denies pain.     OBJECTIVE      Vitals:  Resting:     HR: 71 bpm     BP/MAP: 149/81 (100) mmHg     SPO2: 97% (room air)     Post activity     HR: 81 bpm     BP/MAP: 1029/56 (79) mmHg     SPO2: 98% (room air)        Sitting Balance  (MITESH = base of support)  Static      - Trial 1 (sec): 480     - Trial 1 details: independent, with double LE support and with back unsupported    Standing Balance  (MITESH = base of support)  Firm Surface: Double Leg      - Static, Eyes Open       - Trial 1 (sec): 30       - Trial 1 details: modified independent and with double UE support       - Trial 2 (sec): 30       - Trial 2 details: supervision and without UE support       Bed Mobility  - Supine to sit: supervision  - Sit to supine: supervision  Transfers  Assistive devices: none  - Sit to stand: supervision  - Stand pivot: supervision    Ambulation / Gait  - Assistive device: none  - Distance (feet unless otherwise indicated): 450  - Assist Level: supervision  - Surface: even  - Description: step through    Stair Ambulation  - Number of steps: 10;   - Assist Level: supervision  - Rails: bilateral  - Pattern: step to    Patient performed stair training in room with RW surrounding 7 inch step simulating handrails. Patient educated on sequencing with stair negotiation.        Interventions     Training provided: activity tolerance, balance retraining, bed mobility training, functional ambulation, gait training, safety training, transfer training and use of assistive device    Skilled input: Verbal instruction/cues, tactile instruction/cues, posture correction and facilitation  Verbal Consent: Writer verbally educated and received verbal consent for hand placement, positioning of patient, and techniques to be performed  today from patient for clothing adjustments for techniques, hand placement and palpation for techniques and therapist position for techniques as described above and how they are pertinent to the patient's plan of care.  Home Exercise Program/Education Materials: Patient educated on sternal precautions.          Education:   - Present and ready to learn: patient  Education provided during session:  - Results of above outlined education: Verbalizes understanding    ASSESSMENT   Progress: progressing toward goals  Interferring components: decreased activity tolerance and surgical precautions    Discharge needs based on today's assessment:   - Current level of function: slightly below baseline level of function   - Therapy needs at discharge: therapy 1-3 times per week   - Instrumental activities of daily living (IADLs) requiring support at discharge: community mobility, home management and shopping   - Impairments that require further therapy intervention: activity tolerance    AM-PAC  - Generalized Prior Level of Function: IND/MOD I ([facility] 22-24)       Key: MOD A=moderate assistance, IND/MOD I=independent/modified independent  - Generalized Current Level of Function     - Current Mobility Score: 20       AM-PAC Scoring Key= >21 Modified Independent; 20-21 Supervision; 18-19 Minimal assist; 13-17 Moderate assist; 9-12 Max assist; <9 Total assist      PLAN (while hospitalized)  Suggestions for next session as indicated:   PT Frequency: 3-5 x per week      PT/OT Mobility Equipment for Discharge: none  PT/OT ADL Equipment for Discharge: shower chair, RTS  Agreement to plan and goals: patient agrees with goals and treatment plan        GOALS  Review Date: 6/7/2024  Long Term Goals: (to be met by time of discharge from hospital)  State precautions: Patient able to state precautions independent.  Status: progressing/ongoing  Maintain precautions: Patient able to maintain precautions independent.  Status:  progressing/ongoing  Sit to stand: Patient will complete sit to stand transfer with least restrictive device, modified independent.   Status: revised, this goal modified  Ambulation (even): Patient will ambulate on even surface for 500 feet with least restrictive device, modified independent.   Status: revised, this goal modified  3-4 steps: Patient will ambulate 3-4 steps with modified independent.   Documented in the chart in the following areas: Assessment/Plan.      Patient at End of Session:   Location: in bed  Safety measures: alarm system in place/re-engaged, call light within reach, equipment intact and lines intact  Handoff to: nurse      Therapy procedure time and total treatment time can be found documented on the Time Entry flowsheet   16

## 2024-08-22 NOTE — ED ADULT TRIAGE NOTE - CHIEF COMPLAINT QUOTE
Quality 226: Preventive Care And Screening: Tobacco Use: Screening And Cessation Intervention: Patient screened for tobacco use and is an ex/non-smoker Detail Level: Detailed pt with Hx. Alcohol abuse coming with mouth sores, not able to swallow, last drink 2-3 days ago, some hand.

## 2024-12-18 NOTE — ED PROVIDER NOTE - NSFOLLOWUPINSTRUCTIONS_ED_ALL_ED_FT
Cough since weekend Hoarse and mom feels based on pt hx is croup. Need eval for treatment appt 12/18/24 schb at 1130   please take medications as prescribed  please return for any worsening of symptoms.  please follow up with rheumatology, referral list provided.  please follow up with your doctor this week.

## 2025-01-16 NOTE — DISCHARGE NOTE NURSING/CASE MANAGEMENT/SOCIAL WORK - NSDCPECAREGIVERED_GEN_ALL_CORE
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

## 2025-05-15 NOTE — DISCHARGE NOTE PROVIDER - NSDCCAREPROVSEEN_GEN_ALL_CORE_FT
[facility], ADS  Amberly Carlisle
Prior Outpatient Radiology

## 2025-06-17 NOTE — ED ADULT TRIAGE NOTE - ARRIVAL FROM
Has plenty of muscle relaxers (tizanidine, baclofen that she uses interchangeably). Back on gabapentin. Good weight loss following Bariatric surgery 10/2017.     Tramadol  100 mg ER qam, plus 50 mg x 2 BID tramadol IR.    Continue stretching exercises and muscle relaxers (baclofen and tizanidine) should help get her back pain under control.  Previously saw Dr Fish with pain mgmt and completed PT at [facility].     Toradol 60 mg x 1 and Kenalog 80 mg IM x1 today for acute flareups.     [place] report reviewed  11/13/24, 2/14/25, 6/17/25  OFF routine alprazolam.   -- use ativan for acute anxiety only.     Tox screen clean 7/13/22,  7/5/23, 7/16/24, sent 6/17/25    [employer] agreement signed 4/12/23, 7/16/24, reviewed 6/17/25 6/25/19 narcan rx and overdose recognition handout provided.    Home